# Patient Record
Sex: FEMALE | Race: WHITE | NOT HISPANIC OR LATINO | Employment: FULL TIME | URBAN - METROPOLITAN AREA
[De-identification: names, ages, dates, MRNs, and addresses within clinical notes are randomized per-mention and may not be internally consistent; named-entity substitution may affect disease eponyms.]

---

## 2017-01-16 ENCOUNTER — GENERIC CONVERSION - ENCOUNTER (OUTPATIENT)
Dept: OTHER | Facility: OTHER | Age: 37
End: 2017-01-16

## 2017-01-27 ENCOUNTER — ALLSCRIPTS OFFICE VISIT (OUTPATIENT)
Dept: PERINATAL CARE | Facility: MEDICAL CENTER | Age: 37
End: 2017-01-27
Payer: COMMERCIAL

## 2017-01-27 ENCOUNTER — GENERIC CONVERSION - ENCOUNTER (OUTPATIENT)
Dept: OTHER | Facility: OTHER | Age: 37
End: 2017-01-27

## 2017-01-27 PROCEDURE — 76811 OB US DETAILED SNGL FETUS: CPT | Performed by: OBSTETRICS & GYNECOLOGY

## 2017-01-27 PROCEDURE — 76817 TRANSVAGINAL US OBSTETRIC: CPT | Performed by: OBSTETRICS & GYNECOLOGY

## 2017-02-17 ENCOUNTER — GENERIC CONVERSION - ENCOUNTER (OUTPATIENT)
Dept: OTHER | Facility: OTHER | Age: 37
End: 2017-02-17

## 2017-02-17 DIAGNOSIS — E03.9 HYPOTHYROIDISM: ICD-10-CM

## 2017-02-17 DIAGNOSIS — Z34.82 ENCOUNTER FOR SUPERVISION OF OTHER NORMAL PREGNANCY, SECOND TRIMESTER: ICD-10-CM

## 2017-02-17 DIAGNOSIS — O24.414 INSULIN CONTROLLED GESTATIONAL DIABETES MELLITUS DURING PREGNANCY: ICD-10-CM

## 2017-02-20 ENCOUNTER — GENERIC CONVERSION - ENCOUNTER (OUTPATIENT)
Dept: OTHER | Facility: OTHER | Age: 37
End: 2017-02-20

## 2017-02-20 ENCOUNTER — ALLSCRIPTS OFFICE VISIT (OUTPATIENT)
Dept: PERINATAL CARE | Facility: CLINIC | Age: 37
End: 2017-02-20
Payer: COMMERCIAL

## 2017-02-20 PROCEDURE — 76825 ECHO EXAM OF FETAL HEART: CPT | Performed by: OBSTETRICS & GYNECOLOGY

## 2017-02-20 PROCEDURE — 93325 DOPPLER ECHO COLOR FLOW MAPG: CPT | Performed by: OBSTETRICS & GYNECOLOGY

## 2017-02-20 PROCEDURE — 76827 ECHO EXAM OF FETAL HEART: CPT | Performed by: OBSTETRICS & GYNECOLOGY

## 2017-02-23 ENCOUNTER — HOSPITAL ENCOUNTER (OUTPATIENT)
Facility: HOSPITAL | Age: 37
Discharge: HOME | End: 2017-02-23
Attending: OBSTETRICS & GYNECOLOGY | Admitting: OBSTETRICS & GYNECOLOGY
Payer: OTHER MISCELLANEOUS

## 2017-02-23 VITALS — HEART RATE: 93 BPM | SYSTOLIC BLOOD PRESSURE: 137 MMHG | TEMPERATURE: 98.4 F | DIASTOLIC BLOOD PRESSURE: 75 MMHG

## 2017-02-23 PROCEDURE — 99203 OFFICE O/P NEW LOW 30 MIN: CPT

## 2017-02-23 PROCEDURE — 99213 OFFICE O/P EST LOW 20 MIN: CPT

## 2017-02-23 PROCEDURE — 59025 FETAL NON-STRESS TEST: CPT | Performed by: OBSTETRICS & GYNECOLOGY

## 2017-02-23 RX ORDER — MULTIVITAMIN
1 TABLET ORAL DAILY
COMMUNITY
End: 2017-04-29 | Stop reason: ALTCHOICE

## 2017-02-23 RX ORDER — LEVOTHYROXINE SODIUM 0.1 MG/1
100 TABLET ORAL DAILY
COMMUNITY

## 2017-03-06 ENCOUNTER — APPOINTMENT (OUTPATIENT)
Dept: PERINATAL CARE | Facility: CLINIC | Age: 37
End: 2017-03-06
Payer: COMMERCIAL

## 2017-03-06 PROCEDURE — G0108 DIAB MANAGE TRN  PER INDIV: HCPCS | Performed by: OBSTETRICS & GYNECOLOGY

## 2017-03-10 ENCOUNTER — APPOINTMENT (OUTPATIENT)
Dept: PERINATAL CARE | Facility: MEDICAL CENTER | Age: 37
End: 2017-03-10
Payer: COMMERCIAL

## 2017-03-10 PROCEDURE — G0108 DIAB MANAGE TRN  PER INDIV: HCPCS | Performed by: OBSTETRICS & GYNECOLOGY

## 2017-03-18 ENCOUNTER — GENERIC CONVERSION - ENCOUNTER (OUTPATIENT)
Dept: OTHER | Facility: OTHER | Age: 37
End: 2017-03-18

## 2017-04-04 ENCOUNTER — GENERIC CONVERSION - ENCOUNTER (OUTPATIENT)
Dept: OTHER | Facility: OTHER | Age: 37
End: 2017-04-04

## 2017-04-07 ENCOUNTER — ALLSCRIPTS OFFICE VISIT (OUTPATIENT)
Dept: PERINATAL CARE | Facility: MEDICAL CENTER | Age: 37
End: 2017-04-07
Payer: COMMERCIAL

## 2017-04-07 ENCOUNTER — APPOINTMENT (OUTPATIENT)
Dept: PERINATAL CARE | Facility: MEDICAL CENTER | Age: 37
End: 2017-04-07
Payer: COMMERCIAL

## 2017-04-07 ENCOUNTER — GENERIC CONVERSION - ENCOUNTER (OUTPATIENT)
Dept: OTHER | Facility: OTHER | Age: 37
End: 2017-04-07

## 2017-04-07 PROCEDURE — 59025 FETAL NON-STRESS TEST: CPT | Performed by: OBSTETRICS & GYNECOLOGY

## 2017-04-07 PROCEDURE — 76816 OB US FOLLOW-UP PER FETUS: CPT | Performed by: OBSTETRICS & GYNECOLOGY

## 2017-04-11 ENCOUNTER — GENERIC CONVERSION - ENCOUNTER (OUTPATIENT)
Dept: OTHER | Facility: OTHER | Age: 37
End: 2017-04-11

## 2017-04-11 ENCOUNTER — ALLSCRIPTS OFFICE VISIT (OUTPATIENT)
Dept: PERINATAL CARE | Facility: MEDICAL CENTER | Age: 37
End: 2017-04-11
Payer: COMMERCIAL

## 2017-04-11 PROCEDURE — 59025 FETAL NON-STRESS TEST: CPT | Performed by: OBSTETRICS & GYNECOLOGY

## 2017-04-11 PROCEDURE — 76815 OB US LIMITED FETUS(S): CPT | Performed by: OBSTETRICS & GYNECOLOGY

## 2017-04-14 ENCOUNTER — GENERIC CONVERSION - ENCOUNTER (OUTPATIENT)
Dept: OTHER | Facility: OTHER | Age: 37
End: 2017-04-14

## 2017-04-18 ENCOUNTER — GENERIC CONVERSION - ENCOUNTER (OUTPATIENT)
Dept: OTHER | Facility: OTHER | Age: 37
End: 2017-04-18

## 2017-04-18 ENCOUNTER — ALLSCRIPTS OFFICE VISIT (OUTPATIENT)
Dept: PERINATAL CARE | Facility: MEDICAL CENTER | Age: 37
End: 2017-04-18
Payer: COMMERCIAL

## 2017-04-18 PROCEDURE — 59025 FETAL NON-STRESS TEST: CPT | Performed by: OBSTETRICS & GYNECOLOGY

## 2017-04-18 PROCEDURE — 76815 OB US LIMITED FETUS(S): CPT | Performed by: OBSTETRICS & GYNECOLOGY

## 2017-04-21 ENCOUNTER — GENERIC CONVERSION - ENCOUNTER (OUTPATIENT)
Dept: OTHER | Facility: OTHER | Age: 37
End: 2017-04-21

## 2017-04-21 ENCOUNTER — ALLSCRIPTS OFFICE VISIT (OUTPATIENT)
Dept: OTHER | Facility: OTHER | Age: 37
End: 2017-04-21

## 2017-04-24 DIAGNOSIS — O24.414 INSULIN CONTROLLED GESTATIONAL DIABETES MELLITUS DURING PREGNANCY: ICD-10-CM

## 2017-04-25 ENCOUNTER — ALLSCRIPTS OFFICE VISIT (OUTPATIENT)
Dept: PERINATAL CARE | Facility: MEDICAL CENTER | Age: 37
End: 2017-04-25
Payer: COMMERCIAL

## 2017-04-25 ENCOUNTER — GENERIC CONVERSION - ENCOUNTER (OUTPATIENT)
Dept: OTHER | Facility: OTHER | Age: 37
End: 2017-04-25

## 2017-04-25 PROCEDURE — 76815 OB US LIMITED FETUS(S): CPT | Performed by: OBSTETRICS & GYNECOLOGY

## 2017-04-25 PROCEDURE — 59025 FETAL NON-STRESS TEST: CPT | Performed by: OBSTETRICS & GYNECOLOGY

## 2017-04-28 ENCOUNTER — GENERIC CONVERSION - ENCOUNTER (OUTPATIENT)
Dept: OTHER | Facility: OTHER | Age: 37
End: 2017-04-28

## 2017-05-02 ENCOUNTER — GENERIC CONVERSION - ENCOUNTER (OUTPATIENT)
Dept: OTHER | Facility: OTHER | Age: 37
End: 2017-05-02

## 2017-05-02 ENCOUNTER — ALLSCRIPTS OFFICE VISIT (OUTPATIENT)
Dept: PERINATAL CARE | Facility: MEDICAL CENTER | Age: 37
End: 2017-05-02
Payer: COMMERCIAL

## 2017-05-02 LAB
CULT.,B-STREP SENSI PENICILLIN (HISTORICAL): ABNORMAL
EXTERNAL GROUP B STREP ANTIGEN: POSITIVE

## 2017-05-02 PROCEDURE — 76818 FETAL BIOPHYS PROFILE W/NST: CPT | Performed by: OBSTETRICS & GYNECOLOGY

## 2017-05-02 PROCEDURE — 76816 OB US FOLLOW-UP PER FETUS: CPT | Performed by: OBSTETRICS & GYNECOLOGY

## 2017-05-05 ENCOUNTER — GENERIC CONVERSION - ENCOUNTER (OUTPATIENT)
Dept: OTHER | Facility: OTHER | Age: 37
End: 2017-05-05

## 2017-05-07 ENCOUNTER — HOSPITAL ENCOUNTER (INPATIENT)
Facility: HOSPITAL | Age: 37
LOS: 2 days | Discharge: HOME | End: 2017-05-09
Attending: OBSTETRICS & GYNECOLOGY | Admitting: OBSTETRICS & GYNECOLOGY
Payer: COMMERCIAL

## 2017-05-07 ENCOUNTER — ANESTHESIA (INPATIENT)
Dept: LABOR AND DELIVERY | Facility: HOSPITAL | Age: 37
End: 2017-05-07
Payer: COMMERCIAL

## 2017-05-07 ENCOUNTER — ANESTHESIA EVENT (INPATIENT)
Dept: LABOR AND DELIVERY | Facility: HOSPITAL | Age: 37
End: 2017-05-07
Payer: COMMERCIAL

## 2017-05-07 DIAGNOSIS — Z3A.36 36 WEEKS GESTATION OF PREGNANCY: Primary | ICD-10-CM

## 2017-05-07 PROBLEM — E03.9 HYPOTHYROID: Status: ACTIVE | Noted: 2017-05-07

## 2017-05-07 PROBLEM — O09.529 ADVANCED MATERNAL AGE IN MULTIGRAVIDA: Status: ACTIVE | Noted: 2017-05-07

## 2017-05-07 PROBLEM — O24.419 GESTATIONAL DIABETES MELLITUS (GDM): Status: ACTIVE | Noted: 2017-05-07

## 2017-05-07 LAB
ABO GROUP BLD: NORMAL
BASE EXCESS BLDCOA CALC-SCNC: -10.2 MMOL/L (ref 3–11)
BASE EXCESS BLDCOV CALC-SCNC: -7.3 MMOL/L (ref 1–9)
BASOPHILS # BLD AUTO: 0.02 THOUSANDS/ΜL (ref 0–0.1)
BASOPHILS NFR BLD AUTO: 0 % (ref 0–1)
BLD GP AB SCN SERPL QL: NEGATIVE
EOSINOPHIL # BLD AUTO: 0.07 THOUSAND/ΜL (ref 0–0.61)
EOSINOPHIL NFR BLD AUTO: 0 % (ref 0–6)
ERYTHROCYTE [DISTWIDTH] IN BLOOD BY AUTOMATED COUNT: 14.9 % (ref 11.6–15.1)
GLUCOSE SERPL-MCNC: 75 MG/DL (ref 65–140)
GLUCOSE SERPL-MCNC: 79 MG/DL (ref 65–140)
GLUCOSE SERPL-MCNC: 82 MG/DL (ref 65–140)
GLUCOSE SERPL-MCNC: 83 MG/DL (ref 65–140)
GLUCOSE SERPL-MCNC: 89 MG/DL (ref 65–140)
HCO3 BLDCOA-SCNC: 18.3 MMOL/L (ref 17.3–27.3)
HCO3 BLDCOV-SCNC: 17.9 MMOL/L (ref 12.2–28.6)
HCT VFR BLD AUTO: 37 % (ref 34.8–46.1)
HGB BLD-MCNC: 12.1 G/DL (ref 11.5–15.4)
LYMPHOCYTES # BLD AUTO: 1.44 THOUSANDS/ΜL (ref 0.6–4.47)
LYMPHOCYTES NFR BLD AUTO: 9 % (ref 14–44)
MCH RBC QN AUTO: 26.6 PG (ref 26.8–34.3)
MCHC RBC AUTO-ENTMCNC: 32.7 G/DL (ref 31.4–37.4)
MCV RBC AUTO: 81 FL (ref 82–98)
MONOCYTES # BLD AUTO: 0.77 THOUSAND/ΜL (ref 0.17–1.22)
MONOCYTES NFR BLD AUTO: 5 % (ref 4–12)
NEUTROPHILS # BLD AUTO: 13.5 THOUSANDS/ΜL (ref 1.85–7.62)
NEUTS SEG NFR BLD AUTO: 86 % (ref 43–75)
NRBC BLD AUTO-RTO: 0 /100 WBCS
O2 CT VFR BLDCOA CALC: 11.3 ML/DL
OXYHGB MFR BLDCOA: 55.1 %
OXYHGB MFR BLDCOV: 63.1 %
PCO2 BLDCOA: 50.6 MM[HG] (ref 30–60)
PCO2 BLDCOV: 35.3 MM HG (ref 27–43)
PH BLDCOA: 7.18 [PH] (ref 7.23–7.43)
PH BLDCOV: 7.32 [PH] (ref 7.19–7.49)
PLATELET # BLD AUTO: 287 THOUSANDS/UL (ref 149–390)
PMV BLD AUTO: 9.5 FL (ref 8.9–12.7)
PO2 BLDCOA: 28.8 MM HG (ref 5–25)
PO2 BLDCOV: 27.1 MM HG (ref 15–45)
RBC # BLD AUTO: 4.55 MILLION/UL (ref 3.81–5.12)
RH BLD: POSITIVE
SAO2 % BLDCOV: 12.5 ML/DL
SPECIMEN EXPIRATION DATE: NORMAL
WBC # BLD AUTO: 16.1 THOUSAND/UL (ref 4.31–10.16)

## 2017-05-07 PROCEDURE — 86900 BLOOD TYPING SEROLOGIC ABO: CPT | Performed by: OBSTETRICS & GYNECOLOGY

## 2017-05-07 PROCEDURE — 86850 RBC ANTIBODY SCREEN: CPT | Performed by: OBSTETRICS & GYNECOLOGY

## 2017-05-07 PROCEDURE — 82948 REAGENT STRIP/BLOOD GLUCOSE: CPT

## 2017-05-07 PROCEDURE — 86592 SYPHILIS TEST NON-TREP QUAL: CPT | Performed by: OBSTETRICS & GYNECOLOGY

## 2017-05-07 PROCEDURE — 99214 OFFICE O/P EST MOD 30 MIN: CPT

## 2017-05-07 PROCEDURE — 85025 COMPLETE CBC W/AUTO DIFF WBC: CPT | Performed by: OBSTETRICS & GYNECOLOGY

## 2017-05-07 PROCEDURE — 82805 BLOOD GASES W/O2 SATURATION: CPT | Performed by: OBSTETRICS & GYNECOLOGY

## 2017-05-07 PROCEDURE — 86901 BLOOD TYPING SEROLOGIC RH(D): CPT | Performed by: OBSTETRICS & GYNECOLOGY

## 2017-05-07 PROCEDURE — 4A1HXCZ MONITORING OF PRODUCTS OF CONCEPTION, CARDIAC RATE, EXTERNAL APPROACH: ICD-10-PCS | Performed by: OBSTETRICS & GYNECOLOGY

## 2017-05-07 RX ORDER — BUTORPHANOL TARTRATE 1 MG/ML
1 INJECTION, SOLUTION INTRAMUSCULAR; INTRAVENOUS ONCE
Status: COMPLETED | OUTPATIENT
Start: 2017-05-07 | End: 2017-05-07

## 2017-05-07 RX ORDER — DOCUSATE SODIUM 100 MG/1
100 CAPSULE, LIQUID FILLED ORAL 2 TIMES DAILY
Status: DISCONTINUED | OUTPATIENT
Start: 2017-05-07 | End: 2017-05-09 | Stop reason: HOSPADM

## 2017-05-07 RX ORDER — ROPIVACAINE HYDROCHLORIDE 2 MG/ML
INJECTION, SOLUTION EPIDURAL; INFILTRATION; PERINEURAL AS NEEDED
Status: DISCONTINUED | OUTPATIENT
Start: 2017-05-07 | End: 2017-05-07 | Stop reason: SURG

## 2017-05-07 RX ORDER — BUTORPHANOL TARTRATE 1 MG/ML
INJECTION, SOLUTION INTRAMUSCULAR; INTRAVENOUS
Status: COMPLETED
Start: 2017-05-07 | End: 2017-05-07

## 2017-05-07 RX ORDER — ACETAMINOPHEN 325 MG/1
650 TABLET ORAL EVERY 4 HOURS PRN
Status: DISCONTINUED | OUTPATIENT
Start: 2017-05-07 | End: 2017-05-09 | Stop reason: HOSPADM

## 2017-05-07 RX ORDER — IBUPROFEN 600 MG/1
600 TABLET ORAL EVERY 6 HOURS PRN
Status: DISCONTINUED | OUTPATIENT
Start: 2017-05-07 | End: 2017-05-09 | Stop reason: HOSPADM

## 2017-05-07 RX ORDER — SODIUM CHLORIDE 9 MG/ML
125 INJECTION, SOLUTION INTRAVENOUS CONTINUOUS
Status: DISCONTINUED | OUTPATIENT
Start: 2017-05-07 | End: 2017-05-07

## 2017-05-07 RX ORDER — OXYCODONE HYDROCHLORIDE AND ACETAMINOPHEN 5; 325 MG/1; MG/1
2 TABLET ORAL EVERY 4 HOURS PRN
Status: DISCONTINUED | OUTPATIENT
Start: 2017-05-07 | End: 2017-05-09 | Stop reason: HOSPADM

## 2017-05-07 RX ORDER — LEVOTHYROXINE SODIUM 0.1 MG/1
100 TABLET ORAL
Status: DISCONTINUED | OUTPATIENT
Start: 2017-05-08 | End: 2017-05-09 | Stop reason: HOSPADM

## 2017-05-07 RX ORDER — DIAPER,BRIEF,INFANT-TODD,DISP
1 EACH MISCELLANEOUS AS NEEDED
Status: DISCONTINUED | OUTPATIENT
Start: 2017-05-07 | End: 2017-05-09 | Stop reason: HOSPADM

## 2017-05-07 RX ORDER — OXYTOCIN/RINGER'S LACTATE 30/500 ML
1-30 PLASTIC BAG, INJECTION (ML) INTRAVENOUS
Status: DISCONTINUED | OUTPATIENT
Start: 2017-05-07 | End: 2017-05-07

## 2017-05-07 RX ORDER — OXYCODONE HYDROCHLORIDE AND ACETAMINOPHEN 5; 325 MG/1; MG/1
1 TABLET ORAL EVERY 4 HOURS PRN
Status: DISCONTINUED | OUTPATIENT
Start: 2017-05-07 | End: 2017-05-09 | Stop reason: HOSPADM

## 2017-05-07 RX ORDER — LEVOTHYROXINE SODIUM 0.1 MG/1
100 TABLET ORAL DAILY
Status: DISCONTINUED | OUTPATIENT
Start: 2017-05-07 | End: 2017-05-07

## 2017-05-07 RX ORDER — SENNOSIDES 8.6 MG
1 TABLET ORAL
Status: DISCONTINUED | OUTPATIENT
Start: 2017-05-07 | End: 2017-05-09 | Stop reason: HOSPADM

## 2017-05-07 RX ADMIN — IBUPROFEN 600 MG: 600 TABLET, FILM COATED ORAL at 19:32

## 2017-05-07 RX ADMIN — WITCH HAZEL 1 PAD: 500 SOLUTION RECTAL; TOPICAL at 19:32

## 2017-05-07 RX ADMIN — BENZOCAINE AND MENTHOL: 20; .5 SPRAY TOPICAL at 19:32

## 2017-05-07 RX ADMIN — ROPIVACAINE HYDROCHLORIDE 8 ML: 2 INJECTION, SOLUTION EPIDURAL; INFILTRATION at 11:05

## 2017-05-07 RX ADMIN — HYDROCORTISONE 1 APPLICATION: 10 CREAM TOPICAL at 19:32

## 2017-05-07 RX ADMIN — SODIUM CHLORIDE 999 ML/HR: 0.9 INJECTION, SOLUTION INTRAVENOUS at 09:28

## 2017-05-07 RX ADMIN — Medication 2000 MG: at 09:31

## 2017-05-07 RX ADMIN — ROPIVACAINE HYDROCHLORIDE: 2 INJECTION, SOLUTION EPIDURAL; INFILTRATION at 11:12

## 2017-05-07 RX ADMIN — Medication 1 MILLI-UNITS/MIN: at 12:44

## 2017-05-07 RX ADMIN — SODIUM CHLORIDE 125 ML/HR: 0.9 INJECTION, SOLUTION INTRAVENOUS at 10:40

## 2017-05-07 RX ADMIN — CEFAZOLIN SODIUM 1000 MG: 1 SOLUTION INTRAVENOUS at 16:38

## 2017-05-07 RX ADMIN — BUTORPHANOL TARTRATE 1 MG: 1 INJECTION, SOLUTION INTRAMUSCULAR; INTRAVENOUS at 10:00

## 2017-05-07 RX ADMIN — LEVOTHYROXINE SODIUM 100 MCG: 100 TABLET ORAL at 14:03

## 2017-05-08 LAB — RPR SER QL: NORMAL

## 2017-05-08 RX ADMIN — IBUPROFEN 600 MG: 600 TABLET, FILM COATED ORAL at 08:28

## 2017-05-08 RX ADMIN — IBUPROFEN 600 MG: 600 TABLET, FILM COATED ORAL at 01:50

## 2017-05-08 RX ADMIN — IBUPROFEN 600 MG: 600 TABLET, FILM COATED ORAL at 16:04

## 2017-05-08 RX ADMIN — LEVOTHYROXINE SODIUM 100 MCG: 100 TABLET ORAL at 05:06

## 2017-05-08 RX ADMIN — DOCUSATE SODIUM 100 MG: 100 CAPSULE, LIQUID FILLED ORAL at 18:35

## 2017-05-08 RX ADMIN — DOCUSATE SODIUM 100 MG: 100 CAPSULE, LIQUID FILLED ORAL at 08:28

## 2017-05-09 VITALS
DIASTOLIC BLOOD PRESSURE: 72 MMHG | HEART RATE: 88 BPM | HEIGHT: 62 IN | TEMPERATURE: 97.7 F | OXYGEN SATURATION: 96 % | BODY MASS INDEX: 40.12 KG/M2 | RESPIRATION RATE: 20 BRPM | SYSTOLIC BLOOD PRESSURE: 121 MMHG | WEIGHT: 218 LBS

## 2017-05-09 RX ORDER — DOCUSATE SODIUM 100 MG/1
100 CAPSULE, LIQUID FILLED ORAL 2 TIMES DAILY PRN
Qty: 60 CAPSULE | Refills: 0 | Status: SHIPPED | OUTPATIENT
Start: 2017-05-09 | End: 2019-06-18 | Stop reason: ALTCHOICE

## 2017-05-09 RX ORDER — DIAPER,BRIEF,INFANT-TODD,DISP
1 EACH MISCELLANEOUS AS NEEDED
Qty: 30 G | Refills: 0 | Status: SHIPPED | OUTPATIENT
Start: 2017-05-09 | End: 2019-06-18 | Stop reason: ALTCHOICE

## 2017-05-09 RX ORDER — ACETAMINOPHEN 325 MG/1
650 TABLET ORAL EVERY 4 HOURS PRN
Qty: 30 TABLET | Refills: 0 | Status: SHIPPED | OUTPATIENT
Start: 2017-05-09 | End: 2019-06-18 | Stop reason: ALTCHOICE

## 2017-05-09 RX ORDER — IBUPROFEN 600 MG/1
600 TABLET ORAL EVERY 6 HOURS PRN
Qty: 30 TABLET | Refills: 0 | Status: SHIPPED | OUTPATIENT
Start: 2017-05-09 | End: 2019-06-18 | Stop reason: ALTCHOICE

## 2017-05-09 RX ADMIN — DOCUSATE SODIUM 100 MG: 100 CAPSULE, LIQUID FILLED ORAL at 08:00

## 2017-05-09 RX ADMIN — IBUPROFEN 600 MG: 600 TABLET, FILM COATED ORAL at 05:56

## 2017-05-09 RX ADMIN — LEVOTHYROXINE SODIUM 100 MCG: 100 TABLET ORAL at 05:56

## 2017-05-17 LAB — PLACENTA IN STORAGE: NORMAL

## 2017-08-29 ENCOUNTER — ALLSCRIPTS OFFICE VISIT (OUTPATIENT)
Dept: OTHER | Facility: OTHER | Age: 37
End: 2017-08-29

## 2018-01-10 NOTE — PROGRESS NOTES
2017         RE: Karen Tucker                                To: [facility]   MR#: [id number]                                   [address]   : Prosper Erickson [address] [facility]   ENC: [id number]                             Fax: [phone]   (Exam #: [id number])      The LMP of this 39year old,  G4, P0-1-2-1 patient was AUG 25 2016, giving   her an IGNACIO of 2017 and a current gestational age of 29 weeks 5 days   by dates  A sonographic examination was performed on 2017 using   real time equipment  The ultrasound examination was performed using   abdominal technique  The patient has a BMI of 39 7  Her blood pressure   today was 112/74  Earliest ultrasound found in her record: 10/25/16 8w4d  17 IGNACIO      Cardiac motion was observed at 125 bpm       INDICATIONS      diabetes, gestational, class A2      Exam Types      Amniotic Fluid Index   NST      RESULTS      Fetus # 1 of 1   Vertex presentation   Placenta Location = Anterior   No placenta previa   Placenta Grade = II      The NST was reactive with no decelerations  AMNIOTIC FLUID      Q1: 5 9      Q2: 4 3      Q3: 4 5      Q4: 4 8   MACK Total = 19 5 cm   Amniotic Fluid: Normal      IMPRESSION      Fontaine IUP   Vertex presentation   Regular fetal heart rate of 125 bpm   Anterior placenta   No placenta previa      RECOMMENDATION      MACK: 1 Week   NST: 2X per week      KALEB Oliver M D     Maternal-Fetal Medicine   Electronically signed 17 11:49

## 2018-01-12 VITALS
WEIGHT: 216.03 LBS | HEIGHT: 62 IN | SYSTOLIC BLOOD PRESSURE: 111 MMHG | BODY MASS INDEX: 39.75 KG/M2 | DIASTOLIC BLOOD PRESSURE: 74 MMHG

## 2018-01-12 VITALS
WEIGHT: 215.6 LBS | DIASTOLIC BLOOD PRESSURE: 76 MMHG | SYSTOLIC BLOOD PRESSURE: 117 MMHG | HEIGHT: 62 IN | BODY MASS INDEX: 39.67 KG/M2

## 2018-01-12 VITALS
SYSTOLIC BLOOD PRESSURE: 102 MMHG | HEIGHT: 62 IN | BODY MASS INDEX: 40.01 KG/M2 | WEIGHT: 217.4 LBS | DIASTOLIC BLOOD PRESSURE: 69 MMHG

## 2018-01-12 NOTE — PROGRESS NOTES
2017         RE: Isidra Gibsonmichi                                To: [facility]   MR#: [id number]                                   [address] [facility]   : [address] [place] [address]   ENC: [id number]                             Fax: [phone]   (Exam #: [id number])      The LMP of this 39year old,  G4, P0-1-2-1 patient was AUG 25 2016, giving   her an IGNACIO of 2017 and a current gestational age of 26 weeks 5 days   by dates  A sonographic examination was performed on 2017 using   real time equipment  The ultrasound examination was performed using   abdominal technique  The patient has a BMI of 39 5  Her blood pressure   today was 117/76  Earliest ultrasound found in her record: 10/25/16 8w4d  17 IGNACIO      Cardiac motion was observed at 141 bpm       INDICATIONS      FOB cardiac defect   increased BMI   diabetes, gestational, class A2      Exam Types      Amniotic Fluid Index      RESULTS      Fetus # 1 of 1   Vertex presentation   Placenta Location = Anterior   No placenta previa   Placenta Grade = II      The NST was reactive with no decelerations  AMNIOTIC FLUID      Q1: 3 2      Q2: 5 0      Q3: 5 4      Q4: 6 2   MACK Total = 19 8 cm   Amniotic Fluid: Normal      IMPRESSION      Fontaine IUP   Vertex presentation   Regular fetal heart rate of 141 bpm   Anterior placenta   No placenta previa      GENERAL COMMENT      Her follow up care should include twice weekly NST's and a once weekly   amniotic fluid assessment, along with her daily kick counts  KALEB Chavarria M D     Maternal-Fetal Medicine   Electronically signed 17 19:31

## 2018-01-12 NOTE — PROGRESS NOTES
2017         RE: Melissa Common                                  To: [facility]   MR#: [id number]                                   [address]   : [address]per Herrera [address] [facility]   ENC: [id number]                             Fax: [phone]   (Exam #: [id number])      The LMP of this 39year old,  G4, P0-1-2-1 patient was AUG 25 2016, giving   her an IGNACIO of 2017 and a current gestational age of 25 weeks 1 day   by dates  A sonographic examination was performed on 2017 using   real time equipment  The ultrasound examination was performed using   abdominal & vaginal techniques  The patient has a BMI of 37 7  Her blood   pressure today was 115/73  Earliest ultrasound found in her record: 10/25/16 8w4d  17 IGNACIO      Virginia Johnson is on prenatal vitamins daily and levothyroxine 100 ?g daily  She   reports an allergy to penicillin that causes hives  She denies any use of   cigarettes, alcohol or illicit drugs  She used to smoke 5 to 10 cigarettes   daily prior to pregnancy  Her past medical history significant for   supraventricular tachycardia for which she required an ablation in   She also reports a history of hypertension that occurred at the end of her   last pregnancy  She also reports a history of hypothyroidism controlled on   levothyroxine and which is followed by Dr Benny Grider  She denies any first   generation family history of hypertension, diabetes, thrombosis or   congenital anomalies on her side  Her gia Pérez reports he was told he   had a hole in the heart and was followed by a pediatric cardiology up   until he was age 8 and then he hasn't gone for follow-up since  He does   not remember being actually discharged from care of his cardiologist or if   his parents just stopped taking him  Her OB history includes 2   terminations at 9 weeks in  and  and a 5 lbs   12 oz  baby boy that   was delivered vaginally on 8/8/2000 after induction at 36 weeks due to   preeclampsia and he remained in the NICU for 8 days but is otherwise   normal now  Virginia Johnson was seen today with both her fianc? and 5 other family   members  They are excited as they are set to be  tomorrow  She is   advanced maternal age and declined any genetic screening previously and   again today as they do not believe it would change their management and   would instead increase their anxiety if it returned as abnormal  Her   initial TSH was 1 76 on October 31  Cardiac motion was observed at 151 bpm       INDICATIONS      fetal anatomical survey   advanced maternal age   prior preeclampsia   obesity   long  interval pregnancy      Exam Types      LEVEL II   Transvaginal      RESULTS      Fetus # 1 of 1   Vertex presentation   Fetal growth appeared normal   Placenta Location = Anterior   No placenta previa      MEASUREMENTS (* Included In Average GA)      AC              16 2 cm        21 weeks 0 days* (30%)   BPD              5 5 cm        22 weeks 6 days* (68%)   HC              19 7 cm        21 weeks 6 days* (41%)   Femur            3 6 cm        21 weeks 4 days* (32%)      Nuchal Fold      4 0 mm   NBL              8 1 mm      Humerus          3 5 cm        21 weeks 6 days  (47%)      Cerebellum       2 4 cm        23 weeks 2 days   Biorbit          3 4 cm        21 weeks 6 days   CisternaMagna    6 6 mm      HC/AC           1 22   FL/AC           0 22   FL/BPD          0 64   EFW (Ac/Fl/Hc)   418 grams - 0 lbs 15 oz                 (26%)      THE AVERAGE GESTATIONAL AGE is 21 weeks 6 days +/- 10 days  AMNIOTIC FLUID         Largest Vertical Pocket = 5 6 cm   Amniotic Fluid: Normal      CERVICAL EVALUATION      The cervix appeared normal (Ultrasound Examination)  SUPINE      Cervical Length: 3 21 cm      OTHER TEST RESULTS           Funneling?: No             Dynamic Changes?: No        Resp   To TFP?: No      ANATOMY      Head Normal   Face/Neck                               Normal   Th  Cav  Normal   Heart                                   See Details   Abd  Cav  Normal   Stomach                                 Normal   Right Kidney                            Normal   Left Kidney                             Normal   Bladder                                 Normal   Abd  Wall                               Normal   Spine                                   Not Visualized   Extrems                                 Normal   Genitalia                               Normal   Placenta                                Normal   Umbl  Cord                              Normal   Uterus                                  Normal   PCI                                     Normal      ANATOMY DETAILS      Visualized Appearing Sonographically Normal:   HEAD: (Calvarium, BPD Level, Cavum, Lateral Ventricles, Choroid Plexus,   Cerebellum, Cisterna Magna);    FACE/NECK: (Neck, Nuchal Fold, Profile,   Orbits, Nose/Lips, Palate, Face);    TH  CAV  : (Lungs, Diaphragm); HEART: (Four Chamber View, Proximal Left Outflow, Proximal Right Outflow,   3VV, 3 Vessel Trachea, Short Axis of Greater Vessels, Ductal Arch, Aortic   Arch, IVC, SVC, Cardiac Axis, Cardiac Position);    ABD  CAV : (Liver);      STOMACH, RIGHT KIDNEY, LEFT KIDNEY, BLADDER, ABD  WALL, EXTREMS: (Lt   Humerus, Rt Humerus, Lt Forearm, Rt Forearm, Lt Hand, Rt Hand, Lt Femur,   Rt Femur, Lt Low Leg, Rt Low Leg, Lt Foot, Rt Foot);    GENITALIA   (Female), PLACENTA, UMBL  CORD, UTERUS, PCI      Not Visualized:   HEART: (Interventricular Septum, Interatrial Septum); SPINE      ANATOMY COMMENTS      Her survey of the fetal anatomy is not complete  No fetal structural abnormality or ultrasound marker for aneuploidy is   identified on the Level II ultrasound study today    The missed or limited   views above are secondary to her skin thickness, fetal position, late   gestational age  Fetal growth and amniotic fluid volume appear normal     Active movement of the fetal body & extremities was seen  There is no   suspicion of a subchorionic bleed  The placental cord insertion was   central       ADNEXA      The left ovary appeared normal and measured 3 0 x 2 0 x 2 6 cm with a   volume of 8 2 cc  The right ovary appeared normal and measured 2 4 x 2 2 x   2 4 cm with a volume of 6 6 cc  IMPRESSION      Fontaine IUP   21 weeks and 6 days by this ultrasound  (IGNACIO=MEENU 3 2017)   Vertex presentation   Fetal growth appeared normal   Regular fetal heart rate of 151 bpm   Anterior placenta   No placenta previa      GENERAL COMMENT      OFFICE CONSULT      As per your request, a consultation was performed on your patient for the   following indication: advanced maternal age, prior  induction for   preeclampsia, declined genetic screening, hypothyroidism, and family   history of a cardiac defect in the father the baby      The patient was informed of the findings and counseled about the   limitations of the exam in detecting all forms of fetal congenital   abnormalities  She denies any vaginal bleeding or uterine cramping/contractions  She does   feel fetal movement  Exam shows she is comfortable and her abdomen is non tender  Follow up recommended:   1  Recommend a fetal echo because of the father the baby's history of a   problem in the heart that required follow-up till he was age 8 by   pediatric cardiology  2  Recommend a follow-up ultrasound for growth at 32 weeks secondary to   her advanced maternal age  3  I reviewed with both parents about her age and increased risk for   aneuploidy and discussed the options of cell free DNA screening versus   invasive screening for aneuploidy   Again they both declined because it   wouldn't change there management and they were worried about the anxiety   it may bring if the results returned as abnormal    4  Recommend serial TSH levels at least every 3 months in pregnancy and   will titrate her medication to keep her TSH less than 3    5  She denies any complications of her SVT in pregnancy  6  Recommend baseline preeclamptic labs  The majority of time (greater then 50%) was spent on counseling and   coordination of care of this patient and /or family member  Approximate   face to face time was 30 minutes  KALEB Whaley M D     Maternal-Fetal Medicine   Electronically signed 01/28/17 11:27

## 2018-01-13 VITALS
BODY MASS INDEX: 40.21 KG/M2 | SYSTOLIC BLOOD PRESSURE: 119 MMHG | WEIGHT: 218.5 LBS | HEIGHT: 62 IN | DIASTOLIC BLOOD PRESSURE: 81 MMHG

## 2018-01-13 VITALS
SYSTOLIC BLOOD PRESSURE: 112 MMHG | BODY MASS INDEX: 39.93 KG/M2 | WEIGHT: 217 LBS | HEIGHT: 62 IN | DIASTOLIC BLOOD PRESSURE: 74 MMHG

## 2018-01-13 NOTE — PROGRESS NOTES
2017         RE: Leah Alvarez                                To: [facility]   MR#: [id number]                                   [address] [facility]   : [address] [place] [address] Dre [address], [address] [facility]   ENC: [id number]                             Fax: [phone]   (Exam #: [id number])      The LMP of this 39year old,  G4, P0-1-2-1 patient was AUG 25 2016, giving   her an IGNACIO of 2017 and a current gestational age of 29 weeks 5 days   by dates  A sonographic examination was performed on 2017 using   real time equipment  The ultrasound examination was performed using   abdominal technique  The patient has a BMI of 39 7  Her blood pressure   today was 102/69  Earliest ultrasound found in her record: 10/25/16 8w4d  17 IGNACIO      Cardiac motion was observed at 132 bpm       INDICATIONS      diabetes, gestational, class A2   morbid obesity      Exam Types      Amniotic Fluid Index      RESULTS      Fetus # 1 of 1   Vertex presentation   Placenta Location = Anterior   No placenta previa   Placenta Grade = II      The NST was reactive with no decelerations  AMNIOTIC FLUID      Q1: 4 4      Q2: 1 1      Q3: 4 3      Q4: 5 0   MACK Total = 14 8 cm   Amniotic Fluid: Normal      IMPRESSION      Fontaine IUP   Vertex presentation   Regular fetal heart rate of 132 bpm   Anterior placenta   No placenta previa      GENERAL COMMENT      Her follow up care should include twice weekly NST's and a once weekly   amniotic fluid assessment, along with her daily kick counts  KALEB Guidry M D     Maternal-Fetal Medicine   Electronically signed 17 18:24

## 2018-01-14 VITALS
DIASTOLIC BLOOD PRESSURE: 61 MMHG | BODY MASS INDEX: 39.31 KG/M2 | SYSTOLIC BLOOD PRESSURE: 127 MMHG | HEIGHT: 62 IN | WEIGHT: 213.6 LBS

## 2018-01-14 VITALS
SYSTOLIC BLOOD PRESSURE: 115 MMHG | WEIGHT: 206.8 LBS | BODY MASS INDEX: 38.05 KG/M2 | HEIGHT: 62 IN | DIASTOLIC BLOOD PRESSURE: 73 MMHG

## 2018-01-14 NOTE — PROGRESS NOTES
2017         RE: Shreya Juarez                                To: [facility]   MR#: [id number]                                   [address] [facility]   : [address] [place] [address]   ENC: [id number]                             Fax: [phone]   (Exam #: [id number])      The LMP of this 39year old,  G4, P0-1-2-1 patient was AUG 25 2016, giving   her an IGNACIO of 2017 and a current gestational age of 26 weeks 1 day   by dates  A sonographic examination was performed on 2017 using real   time equipment  The ultrasound examination was performed using abdominal   technique  The patient has a BMI of 39 5  Her blood pressure today was   111/74  Earliest ultrasound found in her record: 10/25/16 8w4d  17 IGNACIO      Cardiac motion was observed at 141 bpm       INDICATIONS      FOB cardiac defect   Interval growth assesment   obesity      Exam Types      Level I      RESULTS      Fetus # 1 of 1   Vertex presentation   Fetal growth appeared normal   Placenta Location = Anterior   No placenta previa   Placenta Grade = II      MEASUREMENTS (* Included In Average GA)      AC              28 9 cm        33 weeks 0 days* (64%)   BPD              8 7 cm        34 weeks 6 days* (92%)   HC              30 3 cm        33 weeks 2 days* (55%)   Femur            5 9 cm        30 weeks 6 days* (28%)      HC/AC           1 05   FL/AC           0 20   FL/BPD          0 68   EFW (Ac/Fl/Hc)  1968 grams - 4 lbs 5 oz                 (50%)      THE AVERAGE GESTATIONAL AGE is 33 weeks 0 days +/- 18 days  AMNIOTIC FLUID      Q1: 6 4      Q2: 3 0      Q3: 1 7      Q4: 4 2   MACK Total = 15 3 cm   Amniotic Fluid: Normal      ANATOMY DETAILS      Visualized Appearing Sonographically Normal:   HEAD: (Calvarium, BPD Level);    TH  CAV : (Diaphragm);     HEART: (Four   Chamber View, Proximal Left Outflow, Proximal Right Outflow, 3VV, Cardiac   Axis, Cardiac Position);    STOMACH, RIGHT KIDNEY, BLADDER, PLACENTA      Not Visualized:   HEAD: (Cavum, Lateral Ventricles, Cerebellum);    LEFT KIDNEY      IMPRESSION      Fontaine IUP   33 weeks and 0 days by this ultrasound  (IGNACIO=MAY 26 2017)   Vertex presentation   Fetal growth appeared normal   Regular fetal heart rate of 141 bpm   Anterior placenta   No placenta previa      GENERAL COMMENT      On exam today the patient appears well, in no acute distress, and denies   any complaints  Her abdomen is non-tender  There has been appropriate interval fetal growth  Good fetal movement and   tone are seen  The amniotic fluid volume appears normal   The placenta is   anterior and it appears sonographically normal   The anatomic structures   listed above could not be optimally imaged today because of fetal   position; however, these structures were seen on a prior ultrasound and   appeared sonographically normal   The patient was informed of today's   findings and all of her questions were answered  The limitations of   ultrasound were reviewed with the patient   labor precautions and   fetal kick counts were reviewed  We talked about the patient's current state of her diabetes  She   continues to maintain close contact with our diabetes in pregnancy program   and she continues to do a good job at controlling her blood sugars   currently on Levemir in the evening  We discussed appropriate follow-up   and she will continue twice weekly  surveillance with a fetal   growth evaluation 4 weeks for the indication of A2 gestational diabetes   and morbid obesity  Thank you very much for allowing us to participate in the care of this   very nice patient  Should you have any questions, please do not hesitate   to contact our office        Please note, in addition to the time spent discussing the results of the   ultrasound, I spent approximately 10 minutes of face-to-face time with the   patient, greater than 50% of which was spent in counseling and the   coordination of care for this patient  Portions of the record may have been created with voice recognition   software  Occasional wrong word or "sound a like" substitutions may have   occurred due to the inherent limitations of voice recognition software  Read the chart carefully and recognize, using context, where substitutions   have occurred  KALEB Yoder M D     Electronically signed 04/07/17 16:51

## 2018-01-15 VITALS
SYSTOLIC BLOOD PRESSURE: 118 MMHG | BODY MASS INDEX: 36.72 KG/M2 | WEIGHT: 199.56 LBS | HEART RATE: 67 BPM | HEIGHT: 62 IN | DIASTOLIC BLOOD PRESSURE: 72 MMHG

## 2018-01-16 NOTE — PROGRESS NOTES
MAY 2 2017         RE: Serg Wiggins                                To: [facility]   MR#: [id number]                                   [address] [facility]   : [address] [place] [address]   ENC: [id number]                             Fax: [phone]   (Exam #: [id number])      The LMP of this 39year old,  G4, P0-1-2-1 patient was AUG 25 2016, giving   her an IGNACIO of 2017 and a current gestational age of 27 weeks 5 days   by dates  A sonographic examination was performed on MAY 2 2017 using real   time equipment  The ultrasound examination was performed using abdominal   technique  The patient has a BMI of 40 1  Her blood pressure today was   119/81  Earliest ultrasound found in her record: 10/25/16 8w4d  17 IGNACIO      Cardiac motion was observed at 138 bpm       INDICATIONS      diabetes, gestational, class A2   morbid obesity      Exam Types      Amniotic Fluid Index      RESULTS      Fetus # 1 of 1   Vertex presentation   Fetal growth appeared normal   Placenta Location = Anterior   No placenta previa   Placenta Grade = II      The NST was reactive with no decelerations  MEASUREMENTS (* Included In Average GA)      AC              31 4 cm        35 weeks 4 days* (48%)   BPD              9 3 cm        37 weeks 6 days* (86%)   HC              32 0 cm        35 weeks 4 days* (35%)   Femur            7 0 cm        35 weeks 4 days* (55%)      HC/AC           1 02   FL/AC           0 22   FL/BPD          0 75   EFW (Ac/Fl/Hc)  2711 grams - 5 lbs 15 oz                 (43%)      THE AVERAGE GESTATIONAL AGE is 36 weeks 1 day +/- 21 days        AMNIOTIC FLUID      Q1: 5 0      Q2: 4 3      Q3: 5 1      Q4:   MACK Total = 14 5 cm   Amniotic Fluid: Normal      ANATOMY DETAILS      Visualized Appearing Sonographically Normal:   HEAD: (Calvarium, BPD Level, Cavum, Lateral Ventricles, Choroid Plexus,   Cisterna Magna);    TH  CAV : (Diaphragm); HEART: ([facility],   Cardiac Axis, Cardiac Position);    STOMACH, RIGHT KIDNEY, LEFT KIDNEY,   BLADDER, PLACENTA      Suboptimally Visualized:   HEAD: (Cerebellum); HEART: (Proximal Left Outflow, Proximal Right   Outflow)      BIOPHYSICAL PROFILE      The Biophysical Profile score was 10/10  Breathin  Movement: 2  Tone: 2  AFV: 2  NST: 2   The NST was reactive with no decelerations  IMPRESSION      Fontaine IUP   36 weeks and 1 day by this ultrasound  (IGNACIO=MAY 29 2017)   Vertex presentation   Fetal growth appeared normal   Regular fetal heart rate of 138 bpm   Anterior placenta   No placenta previa      GENERAL COMMENT      On exam today the patient appears well, in no acute distress, and denies   any complaints  Her abdomen is non-tender  There has been appropriate interval fetal growth  Good fetal movement and   tone are seen  The amniotic fluid volume appears normal   The placenta is   anterior and it appears sonographically normal   The anatomic structures   listed above could not be optimally imaged today because of fetal   position; however, these structures were seen on a prior ultrasound and   appeared sonographically normal   The patient was informed of today's   findings and all of her questions were answered  The limitations of   ultrasound were reviewed with the patient  Lbor precautions and fetal   kick counts were reviewed  We talked about the patient's current state of her diabetes  She   continues to maintain close contact with our diabetes in pregnancy program   and she continues to do a good job at controlling her blood sugars  Thank you very much for allowing us to participate in the care of this   very nice patient  Should you have any questions, please do not hesitate   to contact our office        Please note, in addition to the time spent discussing the results of the   ultrasound, I spent approximately 10 minutes of face-to-face time with the patient, greater than 50% of which was spent in counseling and the   coordination of care for this patient  KALEB Gonzalez , KALEB KENDRICK S  JORGE Loaiza     Electronically signed 05/02/17 15:30

## 2018-01-16 NOTE — PROGRESS NOTES
2017         RE: Kaci Part                                To: [facility]   MR#: [id number]                                   [address]   : Darron Weber [address] [facility] [phone]   (Exam #: [id number])      The LMP of this 39year old,  G4, P0-1-2-1 patient was AUG 25 2016, giving   her an IGNACIO of 2017 and a current gestational age of 22 weeks 4 days   by dates  A sonographic examination was performed on 2017 using   real time equipment  The ultrasound examination was performed using   abdominal technique  The patient has a BMI of 39 1  Her blood pressure   today was 127/61        Earliest ultrasound found in her record: 10/25/16 8w4d  17 IGNACIO      Cardiac motion was observed at 144 bpm       INDICATIONS      FOB cardiac defect      Exam Types      Fetal Echocardiogram      RESULTS      Fetus # 1 of 1   Vertex presentation   Placenta Location = Anterior   No placenta previa   Placenta Grade = I      AMNIOTIC FLUID      Q1: 6 5      Q2:          Q3: 2 6      Q4: 4 1   MACK Total = 13 1 cm   Amniotic Fluid: Normal      FETAL VESSELS                                     S/D   PI    RI    PSV   AEDV RF                                                    cm/s       Umbilical Artery                 1 16              No   No       Middle Cerebral Artery           1 79      FETAL VESSELS                                    PVSys PVDia PASys  S/D   S/A   DVI   RF       Ductus Venosus:                                                No      ANATOMY DETAILS      Visualized Appearing Sonographically Normal:   HEART: (Four Chamber View, Proximal Left Outflow, Proximal Right Outflow,   3VV, 3 Vessel Trachea, Short Axis of Greater Vessels, Ductal Arch, Aortic   Arch, Interventricular Septum, Interatrial Septum, IVC, SVC, Cardiac Axis,   Cardiac Position);    SPINE: (Cervical Spine, Thoracic Spine, Lumbar   Spine, Sacrum)      ANATOMY COMMENTS      The prior fetal anatomic survey was limited with respect to evaluation of   the spine  The views of the spine which were limited in the prior anatomic   evaluation  appear normal today, which completes the fetal anatomic   evaluation  IMPRESSION      Fontaine IUP   Vertex presentation   Regular fetal heart rate of 144 bpm   Anterior placenta   No placenta previa      GENERAL COMMENT      Fetal echocardiography was performed for the indication of a family   history of a congenital heart defect  The heart is in normal anatomic   position within the left chest   All four chambers were identified with a   normal, 45-degree leftward axis  There is no suspicion of an echogenic   intracardiac focus  Tricuspid regurgitation is not present  Right and   left ventricular and atrial sizes were concordant  The ventricular and   atrial septi appear intact by 2D echo and color Doppler  The aorta arises   in normal anatomic relationship from the left ventricle  The pulmonary   artery arises in normal anatomic relationship from the right ventricle  The short axis and three vessel views appear normal   The ductus arterosis   joins the aorta in a normal anatomic relationship  The aortic arch,   pulmonary veins, inferior and superior vena cava, and the right and left   ventricular outflow tracts were identified and appear normal  The flow   velocities across the mitral, tricuspid, aortic, pulmonary valves, aortic   arch and ductus arteriosus, appear normal  There is no evidence of an   anatomical defect present within the heart  The fetal heart rate was   regular throughout the exam period  There is no suspicion of a fetal   dysrhythmia  The umbilical and middle cerebral artery, and ductus   venosus, Doppler studies are normal       Today's ultrasound findings were discussed in detail with the patient      She was advised of the findings and counseled about the limitations of   fetal echocardiography in detecting all forms of congenital anomalies  For example, fetal echocardiography may not detect small septal defects   and minor valvular abnormalities  Other examples of difficult    diagnosis include post valvular stenosis, coarctation of the aorta, and   anomalous pulmonary venous return  RECOMMENDATION      Growth Ultrasound: at 32 weeks      KALEB Chaney , R MARGARET KENDRICK S  JORGE Reich     Maternal-Fetal Medicine   Electronically signed 17 18:18

## 2018-01-22 VITALS — BODY MASS INDEX: 37.49 KG/M2 | WEIGHT: 205 LBS | SYSTOLIC BLOOD PRESSURE: 120 MMHG | DIASTOLIC BLOOD PRESSURE: 80 MMHG

## 2018-01-22 VITALS — WEIGHT: 217 LBS | DIASTOLIC BLOOD PRESSURE: 74 MMHG | BODY MASS INDEX: 39.69 KG/M2 | SYSTOLIC BLOOD PRESSURE: 116 MMHG

## 2018-01-22 VITALS — BODY MASS INDEX: 39.87 KG/M2 | WEIGHT: 218 LBS | SYSTOLIC BLOOD PRESSURE: 110 MMHG | DIASTOLIC BLOOD PRESSURE: 68 MMHG

## 2018-01-22 VITALS — HEIGHT: 62 IN | BODY MASS INDEX: 39.56 KG/M2 | WEIGHT: 215 LBS

## 2018-01-22 VITALS — DIASTOLIC BLOOD PRESSURE: 82 MMHG | WEIGHT: 216 LBS | SYSTOLIC BLOOD PRESSURE: 122 MMHG | BODY MASS INDEX: 39.51 KG/M2

## 2018-01-22 VITALS — WEIGHT: 219 LBS | SYSTOLIC BLOOD PRESSURE: 100 MMHG | DIASTOLIC BLOOD PRESSURE: 52 MMHG | BODY MASS INDEX: 40.06 KG/M2

## 2018-01-22 VITALS — DIASTOLIC BLOOD PRESSURE: 78 MMHG | SYSTOLIC BLOOD PRESSURE: 112 MMHG | WEIGHT: 217 LBS | BODY MASS INDEX: 39.69 KG/M2

## 2018-01-22 VITALS — WEIGHT: 217 LBS | SYSTOLIC BLOOD PRESSURE: 118 MMHG | BODY MASS INDEX: 39.69 KG/M2 | DIASTOLIC BLOOD PRESSURE: 80 MMHG

## 2018-03-07 NOTE — PROGRESS NOTES
Education  iodine Education 3rd Trimester: Third Trimester Education provided:   benefits of breastfeeding, importance of exclusive breastfeeding, early initiation of breastfeeding, exclusive breastfeeding for the first 6 months, frequent feedings for optimal milk production, feeding on demand/baby-led feedings, effective positioning and attachment, importance of breastfeeding after 6 months following introduction of other foods, non-pharmacologic pain relief methods for labor, importance of early skin-to-skin contact and 28 week packet given  rooming-in on 24 hour basis Pregnancy Essentials Reference Guide given   The patient is planning on breastfeeding  The patient is planning on exclusively breastfeeding until the baby is 10months of age  Mother has not registered for prenatal class  Thought has been given to selecting a pediatrician  The mother has discussed personal preferences with her provider  Prenatal education provided by: Vivian Jurado PA-C      Signatures   Electronically signed by : LOLLY Campos;  Apr 21 2017 12:24PM EST                       (Author)

## 2018-03-07 NOTE — PROGRESS NOTES
Education  [facility] Education 1st Trimester:   First Trimester Education provided: benefits of breastfeeding, importance of exclusive breastfeeding, early initiation of breastfeeding and exclusive breastfeeding for the first 6 months   The patient is not planning on breastfeeding  Patient is unsure whether she will breastfeed or not - did not breast feed in the past   Prenatal education provided by: Melissa Sequeira PA-C Date: 10/25/16  Signatures   Electronically signed by :  Melissa Sequeira, [facility]; Oct 25 2016  2:35PM EST                       (Author)

## 2018-07-20 ENCOUNTER — OFFICE VISIT (OUTPATIENT)
Dept: URGENT CARE | Facility: MEDICAL CENTER | Age: 38
End: 2018-07-20
Payer: COMMERCIAL

## 2018-07-20 VITALS
RESPIRATION RATE: 20 BRPM | HEIGHT: 62 IN | SYSTOLIC BLOOD PRESSURE: 144 MMHG | DIASTOLIC BLOOD PRESSURE: 84 MMHG | TEMPERATURE: 97.6 F | HEART RATE: 80 BPM | BODY MASS INDEX: 40.48 KG/M2 | OXYGEN SATURATION: 100 % | WEIGHT: 220 LBS

## 2018-07-20 DIAGNOSIS — L25.9 CONTACT DERMATITIS, UNSPECIFIED CONTACT DERMATITIS TYPE, UNSPECIFIED TRIGGER: ICD-10-CM

## 2018-07-20 DIAGNOSIS — J02.9 ACUTE PHARYNGITIS, UNSPECIFIED ETIOLOGY: Primary | ICD-10-CM

## 2018-07-20 PROCEDURE — S9088 SERVICES PROVIDED IN URGENT: HCPCS

## 2018-07-20 PROCEDURE — 99213 OFFICE O/P EST LOW 20 MIN: CPT

## 2018-07-20 RX ORDER — LEVOTHYROXINE SODIUM 0.1 MG/1
TABLET ORAL
COMMUNITY
End: 2018-07-20 | Stop reason: SDUPTHER

## 2018-07-20 RX ORDER — METHYLPREDNISOLONE 4 MG/1
TABLET ORAL
Qty: 21 TABLET | Refills: 0 | Status: SHIPPED | OUTPATIENT
Start: 2018-07-20 | End: 2019-06-18 | Stop reason: ALTCHOICE

## 2018-07-20 NOTE — PROGRESS NOTES
[address] [facility]        NAME: Aaliyah Mcguire is a 43 y o  female  : 1980    MRN: [id number]      Assessment and Plan   Acute pharyngitis, unspecified etiology [J02 9]  1  Acute pharyngitis, unspecified etiology     2  Contact dermatitis, unspecified contact dermatitis type, unspecified trigger  Methylprednisolone 4 MG TBPK         Patient Instructions       Follow up with PCP in 3-5 days  Proceed to  ER if symptoms worsen  Chief Complaint     Chief Complaint   Patient presents with    Sore Throat     Sore throat today  Pt states her throat feels swollen   Rash     Itchy rash covering pt's body x 2-3 weeks  History of Present Illness       Sore Throat    This is a new problem  The current episode started today  The problem has been unchanged  There has been no fever  Associated symptoms include swollen glands  She has had no exposure to strep  She has tried nothing for the symptoms  The treatment provided no relief  Rash   This is a new problem  The current episode started 1 to 4 weeks ago  The problem is unchanged  The affected locations include the abdomen and back  The rash is characterized by dryness, itchiness, redness and scaling  She was exposed to nothing  Associated symptoms include a sore throat  Review of Systems   Review of Systems   Constitutional: Negative  HENT: Positive for sore throat  Respiratory: Negative  Cardiovascular: Negative  Skin: Positive for rash           Current Medications       Current Outpatient Prescriptions:     levothyroxine 100 mcg tablet, Take 100 mcg by mouth daily, Disp: , Rfl:     acetaminophen (TYLENOL) 325 mg tablet, Take 2 tablets by mouth every 4 (four) hours as needed for mild pain, headaches or fever, Disp: 30 tablet, Rfl: 0    benzocaine-menthol-lanolin-aloe (DERMOPLAST) 20-0 5 % topical spray, Apply 1 application topically 4 (four) times a day as needed for mild pain for up to 30 days, Disp: 1 each, Rfl: 0   docusate sodium (COLACE) 100 mg capsule, Take 1 capsule by mouth 2 (two) times a day as needed for constipation for up to 30 days, Disp: 60 capsule, Rfl: 0    hydrocortisone 1 % cream, Apply 1 application topically as needed for irritation for up to 10 days, Disp: 30 g, Rfl: 0    ibuprofen (MOTRIN) 600 mg tablet, Take 1 tablet by mouth every 6 (six) hours as needed for mild pain, moderate pain, fever or headaches for up to 30 days, Disp: 30 tablet, Rfl: 0    Methylprednisolone 4 MG TBPK, Use as directed on package, Disp: 21 tablet, Rfl: 0    Prenatal Vit-Fe Fumarate-FA (PRENATAL VITAMIN PO), Take 1 tablet by mouth daily, Disp: , Rfl:     witch hazel-glycerin (TUCKS) topical pad, Apply 1 pad topically as needed for irritation for up to 30 days, Disp: 40 each, Rfl: 0    Current Allergies     Allergies as of 07/20/2018 - Reviewed 07/20/2018   Allergen Reaction Noted    Penicillins Hives 02/23/2017            The following portions of the patient's history were reviewed and updated as appropriate: allergies, current medications, past family history, past medical history, past social history, past surgical history and problem list      Past Medical History:   Diagnosis Date    Diabetes mellitus ([address] )     gestational insulin    Disease of thyroid gland     hypo    Fatty liver     Hypertension     occ    SVT (supraventricular tachycardia) ([address] )     Varicella     childhood       Past Surgical History:   Procedure Laterality Date    BREAST BIOPSY      CARDIAC ELECTROPHYSIOLOGY STUDY AND ABLATION      GYNECOLOGIC CRYOSURGERY         Family History   Problem Relation Age of Onset    Asthma Mother     Hypertension Mother     Hypothyroidism Mother     Hypothyroidism Sister     Hypothyroidism Maternal Aunt     Hypothyroidism Maternal Grandmother     Diabetes Maternal Grandfather     Mental illness Sister          Medications have been verified          Objective   /84 (BP Location: Right arm, Patient Position: Sitting, Cuff Size: Large)   Pulse 80   Temp 97 6 °F (36 4 °C) (Temporal)   Resp 20   Ht 5' 2" (1 575 m)   Wt 99 8 kg (220 lb)   SpO2 100%   BMI 40 24 kg/m²        Physical Exam     Physical Exam   Constitutional: She is oriented to person, place, and time  She appears well-developed and well-nourished  HENT:   Right Ear: Tympanic membrane and external ear normal    Left Ear: Tympanic membrane and external ear normal    Nose: No rhinorrhea  Mouth/Throat: Uvula is midline and mucous membranes are normal  Posterior oropharyngeal edema and posterior oropharyngeal erythema present  Neck: Normal range of motion  No edema present  Cardiovascular: Normal rate, regular rhythm, S1 normal, S2 normal and normal heart sounds  No murmur heard  Pulmonary/Chest: Effort normal and breath sounds normal  No respiratory distress  She has no wheezes  She has no rales  She exhibits no tenderness  Lymphadenopathy:     She has no cervical adenopathy  Neurological: She is alert and oriented to person, place, and time  Skin: Skin is warm, dry and intact  Rash noted  Rash is maculopapular  There is erythema  Psychiatric: She has a normal mood and affect  Her speech is normal and behavior is normal    Nursing note and vitals reviewed

## 2018-07-20 NOTE — PATIENT INSTRUCTIONS
RST -  Take steroid as directed  Pharyngitis   WHAT YOU NEED TO KNOW:   Pharyngitis, or sore throat, is inflammation of the tissues and structures in your pharynx (throat)  Pharyngitis is most often caused by bacteria  It may also be caused by a cold or flu virus  Other causes include smoking, allergies, or acid reflux  DISCHARGE INSTRUCTIONS:   Call 911 for any of the following:   · You have trouble breathing or swallowing because your throat is swollen or sore  Return to the emergency department if:   · You are drooling because it hurts too much to swallow  · Your fever is higher than 102? F (39?C) or lasts longer than 3 days  · You are confused  · You taste blood in your throat  Contact your healthcare provider if:   · Your throat pain gets worse  · You have a painful lump in your throat that does not go away after 5 days  · Your symptoms do not improve after 5 days  · You have questions or concerns about your condition or care  Medicines:  Viral pharyngitis will go away on its own without treatment  Your sore throat should start to feel better in 3 to 5 days for both viral and bacterial infections  You may need any of the following:  · Antibiotics  treat a bacterial infection  · NSAIDs , such as ibuprofen, help decrease swelling, pain, and fever  NSAIDs can cause stomach bleeding or kidney problems in certain people  If you take blood thinner medicine, always ask your healthcare provider if NSAIDs are safe for you  Always read the medicine label and follow directions  · Acetaminophen  decreases pain and fever  It is available without a doctor's order  Ask how much to take and how often to take it  Follow directions  Acetaminophen can cause liver damage if not taken correctly  · Take your medicine as directed  Contact your healthcare provider if you think your medicine is not helping or if you have side effects  Tell him or her if you are allergic to any medicine   Keep a list of the medicines, vitamins, and herbs you take  Include the amounts, and when and why you take them  Bring the list or the pill bottles to follow-up visits  Carry your medicine list with you in case of an emergency  Manage your symptoms:   · Gargle salt water  Mix ¼ teaspoon salt in an 8 ounce glass of warm water and gargle  This may help decrease swelling in your throat  · Drink liquids as directed  You may need to drink more liquids than usual  Liquids may help soothe your throat and prevent dehydration  Ask how much liquid to drink each day and which liquids are best for you  · Use a cool-steam humidifier  to help moisten the air in your room and calm your cough  · Soothe your throat  with cough drops, ice, soft foods, or popsicles  Prevent the spread of pharyngitis:  Cover your mouth and nose when you cough or sneeze  Do not share food or drinks  Wash your hands often  Use soap and water  If soap and water are unavailable, use an alcohol based hand   Follow up with your healthcare provider as directed:  Write down your questions so you remember to ask them during your visits  © 2017 [address] Information is for End User's use only and may not be sold, redistributed or otherwise used for commercial purposes  All illustrations and images included in CareNotes® are the copyrighted property of [employer]  or Nadir Bailey  The above information is an  only  It is not intended as medical advice for individual conditions or treatments  Talk to your doctor, nurse or pharmacist before following any medical regimen to see if it is safe and effective for you

## 2018-12-02 ENCOUNTER — OFFICE VISIT (OUTPATIENT)
Dept: URGENT CARE | Facility: MEDICAL CENTER | Age: 38
End: 2018-12-02
Payer: COMMERCIAL

## 2018-12-02 VITALS
DIASTOLIC BLOOD PRESSURE: 83 MMHG | HEART RATE: 88 BPM | SYSTOLIC BLOOD PRESSURE: 127 MMHG | BODY MASS INDEX: 38.04 KG/M2 | RESPIRATION RATE: 16 BRPM | TEMPERATURE: 98 F | WEIGHT: 208 LBS

## 2018-12-02 DIAGNOSIS — H01.001 BLEPHARITIS OF RIGHT UPPER EYELID, UNSPECIFIED TYPE: Primary | ICD-10-CM

## 2018-12-02 PROCEDURE — S9088 SERVICES PROVIDED IN URGENT: HCPCS | Performed by: PHYSICIAN ASSISTANT

## 2018-12-02 PROCEDURE — 99213 OFFICE O/P EST LOW 20 MIN: CPT | Performed by: PHYSICIAN ASSISTANT

## 2018-12-02 RX ORDER — OLOPATADINE HYDROCHLORIDE 2 MG/ML
1 SOLUTION/ DROPS OPHTHALMIC DAILY
Qty: 10 ML | Refills: 0 | Status: SHIPPED | OUTPATIENT
Start: 2018-12-02 | End: 2019-06-18 | Stop reason: ALTCHOICE

## 2018-12-02 NOTE — PATIENT INSTRUCTIONS
1  Cool compresses to eyes 5-8 min 3-4x daily as needed for comfort  2  Use Pataday 1 drop into each eye daily as needed for itching  3   Follow up with PCP in 3-5 days if symptoms persist

## 2018-12-02 NOTE — PROGRESS NOTES
[facility]        NAME: Tejinder Umana is a 43 y o  female  : 1980    MRN: [id number]  DATE: 2018  TIME: 11:20 AM    Assessment and Plan   Blepharitis of right upper eyelid, unspecified type [H01 001]  1  Blepharitis of right upper eyelid, unspecified type  olopatadine HCl (PATADAY) 0 2 % opth drops         Patient Instructions     1  Cool compresses to eyes 5-8 min 3-4x daily as needed for comfort  2  Use Pataday 1 drop into each eye daily as needed for itching  3  Follow up with PCP in 3-5 days if symptoms persist      Chief Complaint     Chief Complaint   Patient presents with    Eye Problem     feels eyelids are swollen and eyes itch, slight discharge         History of Present Illness       The patient is a 77-year-old female presents with bilateral eyelid redness, swelling and itchiness  She denies any ocular drainage or pain, she denies any nasal symptoms, congestion or fever  Review of Systems   Review of Systems   Constitutional: Negative  HENT: Negative  Eyes: Positive for itching  Negative for photophobia, discharge, redness and visual disturbance           Current Medications       Current Outpatient Prescriptions:     acetaminophen (TYLENOL) 325 mg tablet, Take 2 tablets by mouth every 4 (four) hours as needed for mild pain, headaches or fever, Disp: 30 tablet, Rfl: 0    levothyroxine 100 mcg tablet, Take 100 mcg by mouth daily, Disp: , Rfl:     benzocaine-menthol-lanolin-aloe (DERMOPLAST) 20-0 5 % topical spray, Apply 1 application topically 4 (four) times a day as needed for mild pain for up to 30 days, Disp: 1 each, Rfl: 0    docusate sodium (COLACE) 100 mg capsule, Take 1 capsule by mouth 2 (two) times a day as needed for constipation for up to 30 days, Disp: 60 capsule, Rfl: 0    hydrocortisone 1 % cream, Apply 1 application topically as needed for irritation for up to 10 days, Disp: 30 g, Rfl: 0    ibuprofen (MOTRIN) 600 mg tablet, Take 1 tablet by mouth every 6 (six) hours as needed for mild pain, moderate pain, fever or headaches for up to 30 days, Disp: 30 tablet, Rfl: 0    Methylprednisolone 4 MG TBPK, Use as directed on package (Patient not taking: Reported on 12/2/2018 ), Disp: 21 tablet, Rfl: 0    olopatadine HCl (PATADAY) 0 2 % opth drops, Administer 1 drop to both eyes daily, Disp: 10 mL, Rfl: 0    Prenatal Vit-Fe Fumarate-FA (PRENATAL VITAMIN PO), Take 1 tablet by mouth daily, Disp: , Rfl:     witch hazel-glycerin (TUCKS) topical pad, Apply 1 pad topically as needed for irritation for up to 30 days, Disp: 40 each, Rfl: 0    Current Allergies     Allergies as of 12/02/2018 - Reviewed 12/02/2018   Allergen Reaction Noted    Penicillins Hives 02/23/2017            The following portions of the patient's history were reviewed and updated as appropriate: allergies, current medications, past family history, past medical history, past social history, past surgical history and problem list      Past Medical History:   Diagnosis Date    Diabetes mellitus ([address] )     gestational insulin    Disease of thyroid gland     hypo    Fatty liver     Hypertension     occ    SVT (supraventricular tachycardia) ([address] )     Varicella     childhood       Past Surgical History:   Procedure Laterality Date    BREAST BIOPSY      CARDIAC ELECTROPHYSIOLOGY STUDY AND ABLATION      GYNECOLOGIC CRYOSURGERY         Family History   Problem Relation Age of Onset    Asthma Mother     Hypertension Mother     Hypothyroidism Mother     Hypothyroidism Sister     Hypothyroidism Maternal Aunt     Hypothyroidism Maternal Grandmother     Diabetes Maternal Grandfather     Mental illness Sister          Medications have been verified          Objective   /83 (Patient Position: Sitting)   Pulse 88   Temp 98 °F (36 7 °C) (Temporal)   Resp 16   Wt 94 3 kg (208 lb)   BMI 38 04 kg/m²        Physical Exam     Physical Exam   Constitutional: She appears well-developed and well-nourished  No distress  HENT:   Head: Normocephalic and atraumatic  Right Ear: Tympanic membrane and ear canal normal    Left Ear: Tympanic membrane and ear canal normal    Nose: Nose normal    Mouth/Throat: Uvula is midline, oropharynx is clear and moist and mucous membranes are normal    Eyes: Pupils are equal, round, and reactive to light  Conjunctivae and EOM are normal  Right eye exhibits no chemosis, no discharge and no hordeolum  Cardiovascular: Normal rate, regular rhythm and normal heart sounds  No murmur heard    Pulmonary/Chest: Effort normal and breath sounds normal

## 2018-12-10 ENCOUNTER — HOSPITAL ENCOUNTER (EMERGENCY)
Facility: HOSPITAL | Age: 38
Discharge: HOME | End: 2018-12-10
Attending: EMERGENCY MEDICINE | Admitting: EMERGENCY MEDICINE
Payer: COMMERCIAL

## 2018-12-10 ENCOUNTER — APPOINTMENT (EMERGENCY)
Dept: CT IMAGING | Facility: HOSPITAL | Age: 38
End: 2018-12-10
Payer: COMMERCIAL

## 2018-12-10 VITALS
HEIGHT: 62 IN | RESPIRATION RATE: 20 BRPM | BODY MASS INDEX: 38.34 KG/M2 | HEART RATE: 93 BPM | WEIGHT: 208.34 LBS | OXYGEN SATURATION: 100 % | SYSTOLIC BLOOD PRESSURE: 166 MMHG | DIASTOLIC BLOOD PRESSURE: 84 MMHG

## 2018-12-10 DIAGNOSIS — R10.31 RLQ ABDOMINAL PAIN: Primary | ICD-10-CM

## 2018-12-10 LAB
ALBUMIN SERPL BCP-MCNC: 4 G/DL (ref 3.5–5)
ALP SERPL-CCNC: 70 U/L (ref 46–116)
ALT SERPL W P-5'-P-CCNC: 29 U/L (ref 12–78)
ANION GAP SERPL CALCULATED.3IONS-SCNC: 9 MMOL/L (ref 4–13)
AST SERPL W P-5'-P-CCNC: 11 U/L (ref 5–45)
BACTERIA UR QL AUTO: ABNORMAL /HPF
BASOPHILS # BLD AUTO: 0.05 THOUSANDS/ΜL (ref 0–0.1)
BASOPHILS NFR BLD AUTO: 0 % (ref 0–1)
BILIRUB SERPL-MCNC: 0.3 MG/DL (ref 0.2–1)
BILIRUB UR QL STRIP: NEGATIVE
BUN SERPL-MCNC: 17 MG/DL (ref 5–25)
CALCIUM SERPL-MCNC: 8.7 MG/DL (ref 8.3–10.1)
CHLORIDE SERPL-SCNC: 103 MMOL/L (ref 100–108)
CLARITY UR: CLEAR
CO2 SERPL-SCNC: 28 MMOL/L (ref 21–32)
COLOR UR: YELLOW
CREAT SERPL-MCNC: 0.93 MG/DL (ref 0.6–1.3)
EOSINOPHIL # BLD AUTO: 0.18 THOUSAND/ΜL (ref 0–0.61)
EOSINOPHIL NFR BLD AUTO: 2 % (ref 0–6)
ERYTHROCYTE [DISTWIDTH] IN BLOOD BY AUTOMATED COUNT: 13.6 % (ref 11.6–15.1)
EXT PREG TEST URINE: NEGATIVE
GFR SERPL CREATININE-BSD FRML MDRD: 78 ML/MIN/1.73SQ M
GLUCOSE SERPL-MCNC: 98 MG/DL (ref 65–140)
GLUCOSE UR STRIP-MCNC: NEGATIVE MG/DL
HCT VFR BLD AUTO: 42.2 % (ref 34.8–46.1)
HGB BLD-MCNC: 14.1 G/DL (ref 11.5–15.4)
HGB UR QL STRIP.AUTO: NEGATIVE
IMM GRANULOCYTES # BLD AUTO: 0.06 THOUSAND/UL (ref 0–0.2)
IMM GRANULOCYTES NFR BLD AUTO: 1 % (ref 0–2)
KETONES UR STRIP-MCNC: NEGATIVE MG/DL
LEUKOCYTE ESTERASE UR QL STRIP: ABNORMAL
LIPASE SERPL-CCNC: 149 U/L (ref 73–393)
LYMPHOCYTES # BLD AUTO: 2.05 THOUSANDS/ΜL (ref 0.6–4.47)
LYMPHOCYTES NFR BLD AUTO: 17 % (ref 14–44)
MCH RBC QN AUTO: 28.7 PG (ref 26.8–34.3)
MCHC RBC AUTO-ENTMCNC: 33.4 G/DL (ref 31.4–37.4)
MCV RBC AUTO: 86 FL (ref 82–98)
MONOCYTES # BLD AUTO: 0.81 THOUSAND/ΜL (ref 0.17–1.22)
MONOCYTES NFR BLD AUTO: 7 % (ref 4–12)
NEUTROPHILS # BLD AUTO: 9.14 THOUSANDS/ΜL (ref 1.85–7.62)
NEUTS SEG NFR BLD AUTO: 73 % (ref 43–75)
NITRITE UR QL STRIP: NEGATIVE
NON-SQ EPI CELLS URNS QL MICRO: ABNORMAL /HPF
NRBC BLD AUTO-RTO: 0 /100 WBCS
PH UR STRIP.AUTO: 7.5 [PH] (ref 4.5–8)
PLATELET # BLD AUTO: 330 THOUSANDS/UL (ref 149–390)
PMV BLD AUTO: 9.8 FL (ref 8.9–12.7)
POTASSIUM SERPL-SCNC: 3.8 MMOL/L (ref 3.5–5.3)
PROT SERPL-MCNC: 7.4 G/DL (ref 6.4–8.2)
PROT UR STRIP-MCNC: NEGATIVE MG/DL
RBC # BLD AUTO: 4.92 MILLION/UL (ref 3.81–5.12)
RBC #/AREA URNS AUTO: ABNORMAL /HPF
SODIUM SERPL-SCNC: 140 MMOL/L (ref 136–145)
SP GR UR STRIP.AUTO: 1.02 (ref 1–1.03)
UROBILINOGEN UR QL STRIP.AUTO: 0.2 E.U./DL
WBC # BLD AUTO: 12.29 THOUSAND/UL (ref 4.31–10.16)
WBC #/AREA URNS AUTO: ABNORMAL /HPF

## 2018-12-10 PROCEDURE — 96374 THER/PROPH/DIAG INJ IV PUSH: CPT

## 2018-12-10 PROCEDURE — 96375 TX/PRO/DX INJ NEW DRUG ADDON: CPT

## 2018-12-10 PROCEDURE — 85025 COMPLETE CBC W/AUTO DIFF WBC: CPT | Performed by: EMERGENCY MEDICINE

## 2018-12-10 PROCEDURE — 36415 COLL VENOUS BLD VENIPUNCTURE: CPT | Performed by: EMERGENCY MEDICINE

## 2018-12-10 PROCEDURE — 74177 CT ABD & PELVIS W/CONTRAST: CPT

## 2018-12-10 PROCEDURE — 99284 EMERGENCY DEPT VISIT MOD MDM: CPT

## 2018-12-10 PROCEDURE — 81025 URINE PREGNANCY TEST: CPT | Performed by: EMERGENCY MEDICINE

## 2018-12-10 PROCEDURE — 83690 ASSAY OF LIPASE: CPT | Performed by: EMERGENCY MEDICINE

## 2018-12-10 PROCEDURE — 80053 COMPREHEN METABOLIC PANEL: CPT | Performed by: EMERGENCY MEDICINE

## 2018-12-10 PROCEDURE — 81001 URINALYSIS AUTO W/SCOPE: CPT

## 2018-12-10 RX ORDER — KETOROLAC TROMETHAMINE 30 MG/ML
15 INJECTION, SOLUTION INTRAMUSCULAR; INTRAVENOUS ONCE
Status: COMPLETED | OUTPATIENT
Start: 2018-12-10 | End: 2018-12-10

## 2018-12-10 RX ORDER — HYDROMORPHONE HCL/PF 1 MG/ML
0.5 SYRINGE (ML) INJECTION ONCE
Status: COMPLETED | OUTPATIENT
Start: 2018-12-10 | End: 2018-12-10

## 2018-12-10 RX ORDER — ONDANSETRON 2 MG/ML
4 INJECTION INTRAMUSCULAR; INTRAVENOUS ONCE
Status: COMPLETED | OUTPATIENT
Start: 2018-12-10 | End: 2018-12-10

## 2018-12-10 RX ADMIN — IOHEXOL 100 ML: 350 INJECTION, SOLUTION INTRAVENOUS at 17:27

## 2018-12-10 RX ADMIN — KETOROLAC TROMETHAMINE 15 MG: 30 INJECTION, SOLUTION INTRAMUSCULAR at 18:38

## 2018-12-10 RX ADMIN — ONDANSETRON 4 MG: 2 INJECTION INTRAMUSCULAR; INTRAVENOUS at 16:37

## 2018-12-10 RX ADMIN — HYDROMORPHONE HYDROCHLORIDE 0.5 MG: 1 INJECTION, SOLUTION INTRAMUSCULAR; INTRAVENOUS; SUBCUTANEOUS at 16:37

## 2018-12-10 NOTE — ED PROVIDER NOTES
History  Chief Complaint   Patient presents with    Abdominal Pain     RLQ persistent nagging pulsing pain, reported nausea with no vomiting, lower back pain, no hx of cycts  History provided by:  Patient   used: No    Abdominal Pain   Associated symptoms: nausea    Associated symptoms: no dysuria, no fatigue, no fever, no shortness of breath and no vomiting     50-year-old female presented with focal, consistent right lower quadrant pain over the last 2 days  She started feeling on well 3 days ago  She has had some associated nausea, loss of appetite without vomiting  Had a normal bowel movement this morning  She reports some radiation to the low back  No fever, chills, urinary complaints  LMP 2 weeks ago  Slight RLQ tenderness on exam  No rebound/guarding  DDx includes acute appendicitis, ovarian cyst, viral illness, pyelonephritis  Plan labs, urine, CT, pain control, re-eval     Prior to Admission Medications   Prescriptions Last Dose Informant Patient Reported? Taking?    Methylprednisolone 4 MG TBPK   No Yes   Sig: Use as directed on package   Prenatal Vit-Fe Fumarate-FA (PRENATAL VITAMIN PO)   Yes Yes   Sig: Take 1 tablet by mouth daily   acetaminophen (TYLENOL) 325 mg tablet   No Yes   Sig: Take 2 tablets by mouth every 4 (four) hours as needed for mild pain, headaches or fever   benzocaine-menthol-lanolin-aloe (DERMOPLAST) 20-0 5 % topical spray   No No   Sig: Apply 1 application topically 4 (four) times a day as needed for mild pain for up to 30 days   docusate sodium (COLACE) 100 mg capsule   No No   Sig: Take 1 capsule by mouth 2 (two) times a day as needed for constipation for up to 30 days   hydrocortisone 1 % cream   No No   Sig: Apply 1 application topically as needed for irritation for up to 10 days   ibuprofen (MOTRIN) 600 mg tablet   No No   Sig: Take 1 tablet by mouth every 6 (six) hours as needed for mild pain, moderate pain, fever or headaches for up to 30 days levothyroxine 100 mcg tablet   Yes Yes   Sig: Take 100 mcg by mouth daily   olopatadine HCl (PATADAY) 0 2 % opth drops   No Yes   Sig: Administer 1 drop to both eyes daily   witch hazel-glycerin (TUCKS) topical pad   No No   Sig: Apply 1 pad topically as needed for irritation for up to 30 days      Facility-Administered Medications: None       Past Medical History:   Diagnosis Date    Diabetes mellitus ([facility] )     gestational insulin    Disease of thyroid gland     hypo    Fatty liver     Hypertension     occ    SVT (supraventricular tachycardia) ([facility] )     Varicella     childhood       Past Surgical History:   Procedure Laterality Date    BREAST BIOPSY      CARDIAC ELECTROPHYSIOLOGY STUDY AND ABLATION      GYNECOLOGIC CRYOSURGERY         Family History   Problem Relation Age of Onset    Asthma Mother     Hypertension Mother     Hypothyroidism Mother     Hypothyroidism Sister     Hypothyroidism Maternal Aunt     Hypothyroidism Maternal Grandmother     Diabetes Maternal Grandfather     Mental illness Sister      I have reviewed and agree with the history as documented  Social History   Substance Use Topics    Smoking status: Former Smoker    Smokeless tobacco: Never Used    Alcohol use No        Review of Systems   Constitutional: Positive for appetite change  Negative for activity change, fatigue and fever  Respiratory: Negative for chest tightness and shortness of breath  Gastrointestinal: Positive for abdominal pain and nausea  Negative for vomiting  Genitourinary: Negative for difficulty urinating and dysuria  Musculoskeletal: Negative for back pain and neck pain  Neurological: Negative for dizziness and light-headedness  All other systems reviewed and are negative  Physical Exam  Physical Exam   Constitutional: She is oriented to person, place, and time  She appears well-developed and well-nourished  No distress  HENT:   Head: Normocephalic     Mouth/Throat: Oropharynx is clear and moist    Cardiovascular: Normal rate and regular rhythm  Pulmonary/Chest: Effort normal  No respiratory distress  Abdominal: Soft  She exhibits no distension  Mild RLQ tenderness  No rebound/guarding  Musculoskeletal: Normal range of motion  She exhibits no edema  Neurological: She is alert and oriented to person, place, and time  Skin: Skin is warm and dry  Psychiatric: She has a normal mood and affect  Her behavior is normal    Nursing note and vitals reviewed        Vital Signs  ED Triage Vitals [12/10/18 1600]   Temp Pulse Respirations Blood Pressure SpO2   -- 93 20 166/84 100 %      Temp src Heart Rate Source Patient Position - Orthostatic VS BP Location FiO2 (%)   -- Monitor Sitting Right arm --      Pain Score       5           Vitals:    12/10/18 1600   BP: 166/84   Pulse: 93   Patient Position - Orthostatic VS: Sitting       Visual Acuity      ED Medications  Medications   ondansetron (ZOFRAN) injection 4 mg (4 mg Intravenous Given 12/10/18 1637)   HYDROmorphone (DILAUDID) injection 0 5 mg (0 5 mg Intravenous Given 12/10/18 1637)   iohexol (OMNIPAQUE) 350 MG/ML injection (MULTI-DOSE) 100 mL (100 mL Intravenous Given 12/10/18 1727)   ketorolac (TORADOL) injection 15 mg (15 mg Intravenous Given 12/10/18 1838)       Diagnostic Studies  Results Reviewed     Procedure Component Value Units Date/Time    Urine Microscopic [[id number]]  (Abnormal) Collected:  12/10/18 1641    Lab Status:  Final result Specimen:  Urine from Urine, Clean Catch Updated:  12/10/18 1705     RBC, UA None Seen /hpf      WBC, UA 0-1 (A) /hpf      Epithelial Cells Occasional /hpf      Bacteria, UA Occasional /hpf     Comprehensive metabolic panel [[id number]] Collected:  12/10/18 1637    Lab Status:  Final result Specimen:  Blood from Arm, Left Updated:  12/10/18 1702     Sodium 140 mmol/L      Potassium 3 8 mmol/L      Chloride 103 mmol/L      CO2 28 mmol/L      ANION GAP 9 mmol/L      BUN 17 mg/dL      Creatinine 0 93 mg/dL      Glucose 98 mg/dL      Calcium 8 7 mg/dL      AST 11 U/L      ALT 29 U/L      Alkaline Phosphatase 70 U/L      Total Protein 7 4 g/dL      Albumin 4 0 g/dL      Total Bilirubin 0 30 mg/dL      eGFR 78 ml/min/1 73sq m     Narrative:         National Kidney Disease Education Program recommendations are as follows:  GFR calculation is accurate only with a steady state creatinine  Chronic Kidney disease less than 60 ml/min/1 73 sq  meters  Kidney failure less than 15 ml/min/1 73 sq  meters      Lipase [[id number]]  (Normal) Collected:  12/10/18 1637    Lab Status:  Final result Specimen:  Blood from Arm, Left Updated:  12/10/18 1702     Lipase 149 u/L     POCT pregnancy, urine [[id number]]  (Normal) Resulted:  12/10/18 1652    Lab Status:  Final result Updated:  12/10/18 1652     EXT PREG TEST UR (Ref: Negative) Negative    CBC and differential [[id number]]  (Abnormal) Collected:  12/10/18 1637    Lab Status:  Final result Specimen:  Blood from Arm, Left Updated:  12/10/18 1645     WBC 12 29 (H) Thousand/uL      RBC 4 92 Million/uL      Hemoglobin 14 1 g/dL      Hematocrit 42 2 %      MCV 86 fL      MCH 28 7 pg      MCHC 33 4 g/dL      RDW 13 6 %      MPV 9 8 fL      Platelets 862 Thousands/uL      nRBC 0 /100 WBCs      Neutrophils Relative 73 %      Immat GRANS % 1 %      Lymphocytes Relative 17 %      Monocytes Relative 7 %      Eosinophils Relative 2 %      Basophils Relative 0 %      Neutrophils Absolute 9 14 (H) Thousands/µL      Immature Grans Absolute 0 06 Thousand/uL      Lymphocytes Absolute 2 05 Thousands/µL      Monocytes Absolute 0 81 Thousand/µL      Eosinophils Absolute 0 18 Thousand/µL      Basophils Absolute 0 05 Thousands/µL     ED Urine Macroscopic [[id number]]  (Abnormal) Collected:  12/10/18 1641    Lab Status:  Final result Specimen:  Urine Updated:  12/10/18 1643     Color, UA Yellow     Clarity, UA Clear     pH, UA 7 5     Leukocytes, UA Trace (A)     Nitrite, UA Negative     Protein, UA Negative mg/dl      Glucose, UA Negative mg/dl      Ketones, UA Negative mg/dl      Urobilinogen, UA 0 2 E U /dl      Bilirubin, UA Negative     Blood, UA Negative     Specific Gravity, UA 1 020    Narrative:       CLINITEK RESULT                 CT abdomen pelvis with contrast   Final Result by So Novak MD (12/10 1806)      No evidence of acute appendicitis or other acute abnormality to account for the patient's symptoms  Workstation performed: [id number]                    Procedures  Procedures       Phone Contacts  ED Phone Contact    ED Course                               MDM  Number of Diagnoses or Management Options  RLQ abdominal pain: new and requires workup  Diagnosis management comments: 44 y/o female with 2-3 days of RLQ discomfort with some radiation to the back  Mild tenderness on exam and leukocytosis on labs  CT normal  No appendicitis, ovarian pathology  Could be viral illness, menstrual pain  Stable for d/c home  To return with any worsening sx  Amount and/or Complexity of Data Reviewed  Clinical lab tests: ordered and reviewed  Tests in the radiology section of CPT®: reviewed and ordered    Patient Progress  Patient progress: stable    CritCare Time    Disposition  Final diagnoses:   RLQ abdominal pain     Time reflects when diagnosis was documented in both MDM as applicable and the Disposition within this note     Time User Action Codes Description Comment    12/10/2018  6:49 PM Yuri [address]  [R10 31] RLQ abdominal pain       ED Disposition     ED Disposition Condition Comment    Discharge  [place] discharge to home/self care  Condition at discharge: Stable        Follow-up Information     Follow up With Specialties Details Why Contact Info Additional Mark Christopher DO Family Medicine   [address] [facility]   [address] [facility]  [phone] [facility], [address] [facility] [address] [facility], [place], [place], [facility]  If symptoms worsen [address] [address]  [phone] [facility], [address], [place], [place], [address]          Discharge Medication List as of 12/10/2018  6:50 PM      CONTINUE these medications which have NOT CHANGED    Details   acetaminophen (TYLENOL) 325 mg tablet Take 2 tablets by mouth every 4 (four) hours as needed for mild pain, headaches or fever, Starting 5/9/2017, Until Discontinued, Print      levothyroxine 100 mcg tablet Take 100 mcg by mouth daily, Until Discontinued, Historical Med      Methylprednisolone 4 MG TBPK Use as directed on package, Normal      olopatadine HCl (PATADAY) 0 2 % opth drops Administer 1 drop to both eyes daily, Starting Sun 12/2/2018, Normal      Prenatal Vit-Fe Fumarate-FA (PRENATAL VITAMIN PO) Take 1 tablet by mouth daily, Until Discontinued, Historical Med      benzocaine-menthol-lanolin-aloe (DERMOPLAST) 20-0 5 % topical spray Apply 1 application topically 4 (four) times a day as needed for mild pain for up to 30 days, Starting 5/9/2017, Until Thu 6/8/17, Print      docusate sodium (COLACE) 100 mg capsule Take 1 capsule by mouth 2 (two) times a day as needed for constipation for up to 30 days, Starting 5/9/2017, Until Thu 6/8/17, Print      hydrocortisone 1 % cream Apply 1 application topically as needed for irritation for up to 10 days, Starting 5/9/2017, Until Fri 5/19/17, Print      ibuprofen (MOTRIN) 600 mg tablet Take 1 tablet by mouth every 6 (six) hours as needed for mild pain, moderate pain, fever or headaches for up to 30 days, Starting 5/9/2017, Until Thu 6/8/17, Print      witch hazel-glycerin (TUCKS) topical pad Apply 1 pad topically as needed for irritation for up to 30 days, Starting 5/9/2017, Until Thu 6/8/17, Print           No discharge procedures on file      ED Provider  Electronically Signed by           Mona Dominique MD  12/28/18 7606

## 2018-12-10 NOTE — DISCHARGE INSTRUCTIONS
Abdominal Pain   WHAT YOU NEED TO KNOW:   Abdominal pain can be dull, achy, or sharp  You may have pain in one area of your abdomen, or in your entire abdomen  Your pain may be caused by a condition such as constipation, food sensitivity or poisoning, infection, or a blockage  Abdominal pain can also be from a hernia, appendicitis, or an ulcer  Liver, gallbladder, or kidney conditions can also cause abdominal pain  The cause of your abdominal pain may be unknown  DISCHARGE INSTRUCTIONS:   Return to the emergency department if:   · You have new chest pain or shortness of breath  · You have pulsing pain in your upper abdomen or lower back that suddenly becomes constant  · Your pain is in the right lower abdominal area and worsens with movement  · You have a fever over 100 4°F (38°C) or shaking chills  · You are vomiting and cannot keep food or liquids down  · Your pain does not improve or gets worse over the next 8 to 12 hours  · You see blood in your vomit or bowel movements, or they look black and tarry  · Your skin or the whites of your eyes turn yellow  · You are a woman and have a large amount of vaginal bleeding that is not your monthly period  Contact your healthcare provider if:   · You have pain in your lower back  · You are a man and have pain in your testicles  · You have pain when you urinate  · You have questions or concerns about your condition or care  Follow up with your healthcare provider within 24 hours or as directed:  Write down your questions so you remember to ask them during your visits  Medicines:   · Medicines  may be given to calm your stomach and prevent vomiting or to decrease pain  Ask how to take pain medicine safely  · Take your medicine as directed  Contact your healthcare provider if you think your medicine is not helping or if you have side effects  Tell him of her if you are allergic to any medicine   Keep a list of the medicines, vitamins, and herbs you take  Include the amounts, and when and why you take them  Bring the list or the pill bottles to follow-up visits  Carry your medicine list with you in case of an emergency  © 2017 [employer] Information is for End User's use only and may not be sold, redistributed or otherwise used for commercial purposes  All illustrations and images included in CareNotes® are the copyrighted property of A [employer]  or Nadir Bailey  The above information is an  only  It is not intended as medical advice for individual conditions or treatments  Talk to your doctor, nurse or pharmacist before following any medical regimen to see if it is safe and effective for you

## 2018-12-11 NOTE — ED NOTES
Discharge instruction given to patient  No questions or concerns at this time  Patient able to ambulate at this time        Zeny Ohara RN  12/10/18 1578

## 2019-06-18 ENCOUNTER — OFFICE VISIT (OUTPATIENT)
Dept: FAMILY MEDICINE CLINIC | Facility: MEDICAL CENTER | Age: 39
End: 2019-06-18

## 2019-06-18 ENCOUNTER — APPOINTMENT (OUTPATIENT)
Dept: LAB | Facility: MEDICAL CENTER | Age: 39
End: 2019-06-18
Payer: COMMERCIAL

## 2019-06-18 VITALS
WEIGHT: 210.38 LBS | SYSTOLIC BLOOD PRESSURE: 134 MMHG | DIASTOLIC BLOOD PRESSURE: 80 MMHG | BODY MASS INDEX: 37.28 KG/M2 | RESPIRATION RATE: 16 BRPM | HEIGHT: 63 IN | HEART RATE: 80 BPM

## 2019-06-18 DIAGNOSIS — D72.829 LEUKOCYTOSIS, UNSPECIFIED TYPE: ICD-10-CM

## 2019-06-18 DIAGNOSIS — Z13.220 SCREENING FOR LIPID DISORDERS: ICD-10-CM

## 2019-06-18 DIAGNOSIS — E66.9 CLASS 2 OBESITY WITHOUT SERIOUS COMORBIDITY WITH BODY MASS INDEX (BMI) OF 37.0 TO 37.9 IN ADULT, UNSPECIFIED OBESITY TYPE: ICD-10-CM

## 2019-06-18 DIAGNOSIS — E03.9 HYPOTHYROIDISM, UNSPECIFIED TYPE: Primary | ICD-10-CM

## 2019-06-18 DIAGNOSIS — L85.3 DRY SKIN: ICD-10-CM

## 2019-06-18 DIAGNOSIS — M54.50 CHRONIC BILATERAL LOW BACK PAIN WITHOUT SCIATICA: ICD-10-CM

## 2019-06-18 DIAGNOSIS — M25.552 PAIN OF BOTH HIP JOINTS: ICD-10-CM

## 2019-06-18 DIAGNOSIS — E03.9 HYPOTHYROIDISM, UNSPECIFIED TYPE: ICD-10-CM

## 2019-06-18 DIAGNOSIS — G89.29 CHRONIC BILATERAL LOW BACK PAIN WITHOUT SCIATICA: ICD-10-CM

## 2019-06-18 DIAGNOSIS — R20.0 NUMBNESS AND TINGLING OF FOOT: ICD-10-CM

## 2019-06-18 DIAGNOSIS — R20.2 PARESTHESIA OF BOTH HANDS: ICD-10-CM

## 2019-06-18 DIAGNOSIS — O24.419 GESTATIONAL DIABETES MELLITUS (GDM), ANTEPARTUM, GESTATIONAL DIABETES METHOD OF CONTROL UNSPECIFIED: ICD-10-CM

## 2019-06-18 DIAGNOSIS — F41.9 ANXIETY: ICD-10-CM

## 2019-06-18 DIAGNOSIS — M25.551 PAIN OF BOTH HIP JOINTS: ICD-10-CM

## 2019-06-18 DIAGNOSIS — R20.2 NUMBNESS AND TINGLING OF FOOT: ICD-10-CM

## 2019-06-18 PROBLEM — Z3A.36 36 WEEKS GESTATION OF PREGNANCY: Status: RESOLVED | Noted: 2017-05-07 | Resolved: 2019-06-18

## 2019-06-18 PROBLEM — O09.529 ADVANCED MATERNAL AGE IN MULTIGRAVIDA: Status: RESOLVED | Noted: 2017-05-07 | Resolved: 2019-06-18

## 2019-06-18 LAB
ALBUMIN SERPL BCP-MCNC: 3.9 G/DL (ref 3.5–5)
ALP SERPL-CCNC: 67 U/L (ref 46–116)
ALT SERPL W P-5'-P-CCNC: 38 U/L (ref 12–78)
ANION GAP SERPL CALCULATED.3IONS-SCNC: 5 MMOL/L (ref 4–13)
AST SERPL W P-5'-P-CCNC: 18 U/L (ref 5–45)
BASOPHILS # BLD AUTO: 0.04 THOUSANDS/ΜL (ref 0–0.1)
BASOPHILS NFR BLD AUTO: 1 % (ref 0–1)
BILIRUB SERPL-MCNC: 0.61 MG/DL (ref 0.2–1)
BUN SERPL-MCNC: 17 MG/DL (ref 5–25)
CALCIUM SERPL-MCNC: 8.9 MG/DL (ref 8.3–10.1)
CHLORIDE SERPL-SCNC: 104 MMOL/L (ref 100–108)
CHOLEST SERPL-MCNC: 164 MG/DL (ref 50–200)
CO2 SERPL-SCNC: 26 MMOL/L (ref 21–32)
CREAT SERPL-MCNC: 0.71 MG/DL (ref 0.6–1.3)
EOSINOPHIL # BLD AUTO: 0.15 THOUSAND/ΜL (ref 0–0.61)
EOSINOPHIL NFR BLD AUTO: 2 % (ref 0–6)
ERYTHROCYTE [DISTWIDTH] IN BLOOD BY AUTOMATED COUNT: 13.5 % (ref 11.6–15.1)
EST. AVERAGE GLUCOSE BLD GHB EST-MCNC: 120 MG/DL
GFR SERPL CREATININE-BSD FRML MDRD: 108 ML/MIN/1.73SQ M
GLUCOSE SERPL-MCNC: 92 MG/DL (ref 65–140)
HBA1C MFR BLD: 5.8 % (ref 4.2–6.3)
HCT VFR BLD AUTO: 44.3 % (ref 34.8–46.1)
HDLC SERPL-MCNC: 34 MG/DL (ref 40–60)
HGB BLD-MCNC: 14.2 G/DL (ref 11.5–15.4)
IMM GRANULOCYTES # BLD AUTO: 0.05 THOUSAND/UL (ref 0–0.2)
IMM GRANULOCYTES NFR BLD AUTO: 1 % (ref 0–2)
LDLC SERPL CALC-MCNC: 96 MG/DL (ref 0–100)
LYMPHOCYTES # BLD AUTO: 1.96 THOUSANDS/ΜL (ref 0.6–4.47)
LYMPHOCYTES NFR BLD AUTO: 22 % (ref 14–44)
MCH RBC QN AUTO: 28.2 PG (ref 26.8–34.3)
MCHC RBC AUTO-ENTMCNC: 32.1 G/DL (ref 31.4–37.4)
MCV RBC AUTO: 88 FL (ref 82–98)
MONOCYTES # BLD AUTO: 0.7 THOUSAND/ΜL (ref 0.17–1.22)
MONOCYTES NFR BLD AUTO: 8 % (ref 4–12)
NEUTROPHILS # BLD AUTO: 5.85 THOUSANDS/ΜL (ref 1.85–7.62)
NEUTS SEG NFR BLD AUTO: 66 % (ref 43–75)
NRBC BLD AUTO-RTO: 0 /100 WBCS
PLATELET # BLD AUTO: 311 THOUSANDS/UL (ref 149–390)
PMV BLD AUTO: 11.1 FL (ref 8.9–12.7)
POTASSIUM SERPL-SCNC: 4.1 MMOL/L (ref 3.5–5.3)
PROT SERPL-MCNC: 7.4 G/DL (ref 6.4–8.2)
RBC # BLD AUTO: 5.03 MILLION/UL (ref 3.81–5.12)
SODIUM SERPL-SCNC: 135 MMOL/L (ref 136–145)
T4 FREE SERPL-MCNC: 1.11 NG/DL (ref 0.76–1.46)
TRIGL SERPL-MCNC: 170 MG/DL
TSH SERPL DL<=0.05 MIU/L-ACNC: 1.72 UIU/ML (ref 0.36–3.74)
WBC # BLD AUTO: 8.75 THOUSAND/UL (ref 4.31–10.16)

## 2019-06-18 PROCEDURE — 85025 COMPLETE CBC W/AUTO DIFF WBC: CPT

## 2019-06-18 PROCEDURE — 83036 HEMOGLOBIN GLYCOSYLATED A1C: CPT

## 2019-06-18 PROCEDURE — 80061 LIPID PANEL: CPT

## 2019-06-18 PROCEDURE — 84443 ASSAY THYROID STIM HORMONE: CPT

## 2019-06-18 PROCEDURE — 86376 MICROSOMAL ANTIBODY EACH: CPT

## 2019-06-18 PROCEDURE — 80053 COMPREHEN METABOLIC PANEL: CPT

## 2019-06-18 PROCEDURE — 84439 ASSAY OF FREE THYROXINE: CPT

## 2019-06-18 PROCEDURE — 3008F BODY MASS INDEX DOCD: CPT | Performed by: FAMILY MEDICINE

## 2019-06-18 PROCEDURE — 99203 OFFICE O/P NEW LOW 30 MIN: CPT | Performed by: FAMILY MEDICINE

## 2019-06-18 PROCEDURE — 86800 THYROGLOBULIN ANTIBODY: CPT

## 2019-06-18 PROCEDURE — 36415 COLL VENOUS BLD VENIPUNCTURE: CPT

## 2019-06-19 LAB
THYROGLOB AB SERPL-ACNC: <1 IU/ML (ref 0–0.9)
THYROPEROXIDASE AB SERPL-ACNC: 12 IU/ML (ref 0–34)

## 2020-06-19 ENCOUNTER — TELEPHONE (OUTPATIENT)
Dept: OBGYN CLINIC | Facility: CLINIC | Age: 40
End: 2020-06-19

## 2020-06-24 ENCOUNTER — TRANSCRIBE ORDERS (OUTPATIENT)
Dept: ADMINISTRATIVE | Facility: HOSPITAL | Age: 40
End: 2020-06-24

## 2020-06-24 DIAGNOSIS — E03.9 HYPOTHYROIDISM, UNSPECIFIED TYPE: Primary | ICD-10-CM

## 2020-06-30 ENCOUNTER — HOSPITAL ENCOUNTER (OUTPATIENT)
Dept: RADIOLOGY | Facility: MEDICAL CENTER | Age: 40
Discharge: HOME | End: 2020-06-30
Payer: COMMERCIAL

## 2020-06-30 DIAGNOSIS — E03.9 HYPOTHYROIDISM, UNSPECIFIED TYPE: ICD-10-CM

## 2020-06-30 PROCEDURE — 76536 US EXAM OF HEAD AND NECK: CPT

## 2020-07-07 ENCOUNTER — OFFICE VISIT (OUTPATIENT)
Dept: FAMILY MEDICINE CLINIC | Facility: CLINIC | Age: 40
End: 2020-07-07
Payer: COMMERCIAL

## 2020-07-07 VITALS
WEIGHT: 203 LBS | BODY MASS INDEX: 37.36 KG/M2 | HEART RATE: 76 BPM | TEMPERATURE: 98.1 F | HEIGHT: 62 IN | SYSTOLIC BLOOD PRESSURE: 124 MMHG | DIASTOLIC BLOOD PRESSURE: 80 MMHG

## 2020-07-07 DIAGNOSIS — R20.2 PARESTHESIA OF BOTH HANDS: ICD-10-CM

## 2020-07-07 DIAGNOSIS — R14.0 ABDOMINAL BLOATING: ICD-10-CM

## 2020-07-07 DIAGNOSIS — Z12.39 BREAST CANCER SCREENING: Primary | ICD-10-CM

## 2020-07-07 DIAGNOSIS — R20.2 NUMBNESS AND TINGLING OF FOOT: ICD-10-CM

## 2020-07-07 DIAGNOSIS — D72.829 LEUKOCYTOSIS, UNSPECIFIED TYPE: ICD-10-CM

## 2020-07-07 DIAGNOSIS — O24.414 INSULIN CONTROLLED GESTATIONAL DIABETES MELLITUS (GDM) DURING PREGNANCY, ANTEPARTUM: ICD-10-CM

## 2020-07-07 DIAGNOSIS — G47.00 INSOMNIA, UNSPECIFIED TYPE: ICD-10-CM

## 2020-07-07 DIAGNOSIS — M25.50 ARTHRALGIA, UNSPECIFIED JOINT: ICD-10-CM

## 2020-07-07 DIAGNOSIS — R10.2 PELVIC PAIN IN FEMALE: ICD-10-CM

## 2020-07-07 DIAGNOSIS — O24.419 GESTATIONAL DIABETES MELLITUS (GDM), ANTEPARTUM, GESTATIONAL DIABETES METHOD OF CONTROL UNSPECIFIED: ICD-10-CM

## 2020-07-07 DIAGNOSIS — L40.9 PSORIASIS: ICD-10-CM

## 2020-07-07 DIAGNOSIS — R19.4 CHANGE IN BOWEL HABITS: ICD-10-CM

## 2020-07-07 DIAGNOSIS — N92.6 MENSTRUAL IRREGULARITY: ICD-10-CM

## 2020-07-07 DIAGNOSIS — H04.123 DRY EYES: ICD-10-CM

## 2020-07-07 DIAGNOSIS — E03.9 HYPOTHYROIDISM, UNSPECIFIED TYPE: ICD-10-CM

## 2020-07-07 DIAGNOSIS — R20.0 NUMBNESS AND TINGLING OF FOOT: ICD-10-CM

## 2020-07-07 PROBLEM — K44.9 HIATAL HERNIA: Status: ACTIVE | Noted: 2020-07-07

## 2020-07-07 PROCEDURE — 1036F TOBACCO NON-USER: CPT | Performed by: PHYSICIAN ASSISTANT

## 2020-07-07 PROCEDURE — 3008F BODY MASS INDEX DOCD: CPT | Performed by: PHYSICIAN ASSISTANT

## 2020-07-07 PROCEDURE — 99214 OFFICE O/P EST MOD 30 MIN: CPT | Performed by: PHYSICIAN ASSISTANT

## 2020-07-07 RX ORDER — DIPHENHYDRAMINE HCL 25 MG
25 TABLET ORAL
COMMUNITY

## 2020-07-07 RX ORDER — LEVOTHYROXINE SODIUM 50 UG/1
50 CAPSULE ORAL DAILY
COMMUNITY
Start: 2020-06-29

## 2020-07-07 RX ORDER — LORAZEPAM 0.5 MG/1
0.5 TABLET ORAL 2 TIMES DAILY PRN
COMMUNITY
Start: 2020-06-29

## 2020-07-07 RX ORDER — CALCIPOTRIENE 50 UG/G
CREAM TOPICAL 2 TIMES DAILY
Qty: 60 G | Refills: 11 | Status: SHIPPED | OUTPATIENT
Start: 2020-07-07

## 2020-07-07 RX ORDER — PHENOL 1.4 %
AEROSOL, SPRAY (ML) MUCOUS MEMBRANE
COMMUNITY

## 2020-07-07 NOTE — PATIENT INSTRUCTIONS
Problem List Items Addressed This Visit        Endocrine    Gestational diabetes mellitus (GDM)     Check hemoglobin A1c and CMP fasting  Lab Results   Component Value Date    HGBA1C 5 8 06/18/2019            Relevant Orders    Comprehensive metabolic panel    Hemoglobin A1C    Hypothyroid     Per patient although I cannot find results from [facility] her last TSH was last month and it was too and her old provider put her down from 100 mcg daily to 25 mcg daily  She and her pharmacist both thought this was drained so she has only been taking 50 mcg instead of 100 mcg  Recheck TSH along with thyroid antibodies and PTH  Relevant Medications    Levothyroxine Sodium 50 MCG CAPS    Other Relevant Orders    TSH, 3rd generation with Free T4 reflex    Insulin controlled gestational diabetes mellitus (GDM) during pregnancy, antepartum       Musculoskeletal and Integument    Psoriasis     Trial Dovonex cream and if no help than refer to derm  Relevant Medications    calcipotriene (DOVONEX) 0 005 % cream    Other Relevant Orders    Ambulatory referral to Rheumatology       Other    Leukocytosis     Per patient [facility] last CBC showed a WBC of 16 which was 9 the week prior  Repeat CBC  Relevant Orders    CBC and differential    Paresthesia of both hands    Numbness and tingling of foot    Arthralgia     Pt  does have psoriasis so she could have psoriatic arthritis  I will check for lyme and other but do think a referral to rheum would be appropriate  Relevant Orders    Ambulatory referral to Rheumatology    JEANNIE Screen w/ Reflex to Titer/Pattern    Lyme Antibody Profile with reflex to WB    RF Screen w/ Reflex to Titer    C-reactive protein    Change in bowel habits    Dry eyes     Check Sjogren antibodies  Patient has psoriasis and could have other concomitant autoimmune diseases  Relevant Orders    Sjogren's Antibodies    Insomnia     For now continue over-the-counter Benadryl and melatonin  Check labs to rule out other causes  Relevant Orders    Thyroid Antibodies Panel    PTH, intact    Menstrual irregularity     Menses very light and farther apart and has pelvic pain intermittently  Check ultrasound pelvis with transvaginal   Estradiol FSH LH all done roughly 2-4 weeks ago and within normal limits  Relevant Orders    US abdomen and pelvis with transvaginal    Abdominal bloating     Also change in bowel habits from diarrhea constipation bloating because of all these symptoms I will check ultrasound abdomen  Relevant Orders    US abdomen and pelvis with transvaginal    Pelvic pain in female     Patient has not seen gyn in 3 years    Check ultrasound pelvic and transvaginal          Relevant Orders    US abdomen and pelvis with transvaginal      Other Visit Diagnoses     Breast cancer screening    -  Primary    Relevant Orders    Mammo screening bilateral w 3d & cad

## 2020-07-07 NOTE — PROGRESS NOTES
Assessment and Plan:  Patient with a constellation of multiple symptoms  Check blood work refer to rheumatology mammogram ordered check ultrasound abdomen pelvis transvaginal   Consider derm referral   I would like to see patient is 2 weeks after everything is done to review  Problem List Items Addressed This Visit        Endocrine    Gestational diabetes mellitus (GDM)     Check hemoglobin A1c and CMP fasting  Lab Results   Component Value Date    HGBA1C 5 8 06/18/2019            Relevant Orders    Comprehensive metabolic panel    Hemoglobin A1C    Hypothyroid     Per patient although I cannot find results from [facility] her last TSH was last month and it was too and her old provider put her down from 100 mcg daily to 25 mcg daily  She and her pharmacist both thought this was drained so she has only been taking 50 mcg instead of 100 mcg  Recheck TSH along with thyroid antibodies and PTH  Relevant Medications    Levothyroxine Sodium 50 MCG CAPS    Other Relevant Orders    TSH, 3rd generation with Free T4 reflex    Insulin controlled gestational diabetes mellitus (GDM) during pregnancy, antepartum       Musculoskeletal and Integument    Psoriasis     Trial Dovonex cream and if no help than refer to derm  Relevant Medications    calcipotriene (DOVONEX) 0 005 % cream    Other Relevant Orders    Ambulatory referral to Rheumatology       Other    Leukocytosis     Per patient [facility] last CBC showed a WBC of 16 which was 9 the week prior  Repeat CBC  Relevant Orders    CBC and differential    Paresthesia of both hands    Numbness and tingling of foot    Arthralgia     Pt  does have psoriasis so she could have psoriatic arthritis  I will check for lyme and other but do think a referral to rheum would be appropriate            Relevant Orders    Ambulatory referral to Rheumatology    JEANNIE Screen w/ Reflex to Titer/Pattern    Lyme Antibody Profile with reflex to WB    RF Screen w/ Reflex to Titer C-reactive protein    Change in bowel habits    Dry eyes     Check Sjogren antibodies  Patient has psoriasis and could have other concomitant autoimmune diseases  Relevant Orders    Sjogren's Antibodies    Insomnia     For now continue over-the-counter Benadryl and melatonin  Check labs to rule out other causes  Relevant Orders    Thyroid Antibodies Panel    PTH, intact    Menstrual irregularity     Menses very light and farther apart and has pelvic pain intermittently  Check ultrasound pelvis with transvaginal   Estradiol FSH LH all done roughly 2-4 weeks ago and within normal limits  Relevant Orders    US abdomen and pelvis with transvaginal    Abdominal bloating     Also change in bowel habits from diarrhea constipation bloating because of all these symptoms I will check ultrasound abdomen  Relevant Orders    US abdomen and pelvis with transvaginal    Pelvic pain in female     Patient has not seen gyn in 3 years  Check ultrasound pelvic and transvaginal          Relevant Orders    US abdomen and pelvis with transvaginal      Other Visit Diagnoses     Breast cancer screening    -  Primary    Relevant Orders    Mammo screening bilateral w 3d & cad                 Diagnoses and all orders for this visit:    Breast cancer screening  -     Mammo screening bilateral w 3d & cad; Future    Gestational diabetes mellitus (GDM), antepartum, gestational diabetes method of control unspecified  -     Comprehensive metabolic panel  -     Hemoglobin A1C    Hypothyroidism, unspecified type  -     TSH, 3rd generation with Free T4 reflex    Insulin controlled gestational diabetes mellitus (GDM) during pregnancy, antepartum    Psoriasis  -     Ambulatory referral to Rheumatology;  Future  -     calcipotriene (DOVONEX) 0 005 % cream; Apply topically 2 (two) times a day    Leukocytosis, unspecified type  -     CBC and differential; Future    Numbness and tingling of foot    Paresthesia of both hands    Arthralgia, unspecified joint  -     Ambulatory referral to Rheumatology; Future  -     JEANNIE Screen w/ Reflex to Titer/Pattern  -     Lyme Antibody Profile with reflex to WB  -     RF Screen w/ Reflex to Titer  -     C-reactive protein; Future    Change in bowel habits    Insomnia, unspecified type  -     Thyroid Antibodies Panel  -     PTH, intact    Dry eyes  -     Sjogren's Antibodies; Future    Menstrual irregularity  -     US abdomen and pelvis with transvaginal; Future    Abdominal bloating  -     US abdomen and pelvis with transvaginal; Future    Pelvic pain in female  -     US abdomen and pelvis with transvaginal; Future    Other orders  -     Levothyroxine Sodium 50 MCG CAPS; Take 25 mcg by mouth daily   -     LORazepam (ATIVAN) 0 5 mg tablet; Take 0 5 mg by mouth 2 (two) times a day as needed  -     Melatonin 10 MG TABS; Take by mouth  -     diphenhydrAMINE (BENADRYL) 25 mg tablet; Take 25 mg by mouth daily at bedtime as needed for itching (patient takes 4 Benadryl every night)              Subjective:      Patient ID: Wilman Torres is a 38 y o  female  CC:    Chief Complaint   Patient presents with   2012 [facility] patient here to get established and to discuss not feeling "right" including severe insomnia, burning inside out  patient's Dr ordered Seroquel which didnt work, then Ambien which worked  patient now taking Benadryl 100mg and 10mg Melatonin (off Ambien now)  Patient also takes Lorazepam occasionally  Patient tried a weighted blanket, aromatherapy,etc      Skin Problem     patient c/o itchy body and scalp-unsure if its psoriasis  Patient also c/o lighter and irregular periods for about 6 months  patient had blood work done fairly recently  ak       HPI:    New patient here to be established and to discuss a plethora of problems  Patient states she wants 2nd opinion from her normal PCP because he does not seem take the time to listen to her    Patient states that she always has headaches she always has joint pain but most recently in the past month or 2 she has been thinking that she might be perimenopause all because her periods are getting very very light and very far part she is having hot flashes heat intolerance cannot sleep no matter what over-the-counter she takes  Had a lot of diarrhea which now is constipation lot of finger and toe tingling coldness and white hands, feet itching dry eyes uncontrolled psoriasis and her biggest problem which is insomnia  Her PCP gave her Seroquel tried her on various sleeping aids and she had horrible side effects from this and then was recently has been taking 100 mg of Benadryl with melatonin to sleep  She states that even with this her eyes will not close  She has a 1year-old daughter and has not been to the gyn since her delivery  She also has various discomfort and bloating and fullness in her abdomen and pains in her pelvis intermittently in her upper abdomen as well  She had an EGD in the past that showed that she had an ulcer and her soft guess and a small hiatal hernia  She is concerned about her thyroid antibodies and other autoimmune disorders and think she might actually have 2 things going on at once  Her PCP found that her TSH was a level of 2 Willian is in the middle of range and patient has been on 100 mcg of thyroid for many many years and even though this was found he decreased from 100 mcg daily to 25 mcg daily  She the that this was not appropriate and sewed in the pharmacist so she has only been decreasing to 50 mcg the past 2 weeks  She states that many of the woman her family have thyroid problems and that she knows that the psoriasis is autoimmune and could be related other autoimmune problems        The following portions of the patient's history were reviewed and updated as appropriate: allergies, current medications, past family history, past medical history, past social history, past surgical history and problem list       Review of Systems   Constitutional: Positive for diaphoresis and fatigue  HENT: Negative  Eyes: Negative  Respiratory: Negative  Cardiovascular: Negative  Gastrointestinal: Positive for abdominal distention, abdominal pain, constipation and diarrhea  Endocrine: Positive for heat intolerance  Genitourinary: Positive for pelvic pain  Musculoskeletal: Positive for arthralgias  Skin: Negative  iching   Allergic/Immunologic: Negative  Neurological: Positive for headaches  Hematological: Negative  Psychiatric/Behavioral: Positive for sleep disturbance  The patient is nervous/anxious  Data to review:       Objective:    Vitals:    07/07/20 1903   BP: 124/80   Pulse: 76   Temp: 98 1 °F (36 7 °C)   Weight: 92 1 kg (203 lb)   Height: 5' 2" (1 575 m)        Physical Exam   Constitutional: She is oriented to person, place, and time  She appears well-developed and well-nourished  No distress  HENT:   Head: Normocephalic and atraumatic  Eyes: Conjunctivae are normal  Right eye exhibits no discharge  Left eye exhibits no discharge  Neck: Neck supple  Carotid bruit is not present  Cardiovascular: Normal rate, regular rhythm and normal heart sounds  Exam reveals no gallop and no friction rub  No murmur heard  Pulmonary/Chest: Effort normal and breath sounds normal  No respiratory distress  She has no wheezes  She has no rales  Abdominal: Soft  Bowel sounds are normal  She exhibits distension  She exhibits no shifting dullness, no pulsatile liver, no fluid wave, no abdominal bruit, no ascites, no pulsatile midline mass and no mass  There is tenderness in the epigastric area, suprapubic area and left upper quadrant  There is no rigidity, no rebound, no guarding, no CVA tenderness and negative Gonzalez's sign  Neurological: She is alert and oriented to person, place, and time  Skin: Skin is warm and dry  She is not diaphoretic     Psychiatric: Judgment and thought content normal  Her mood appears anxious  Her speech is rapid and/or pressured  She is agitated  Cognition and memory are normal    Nursing note and vitals reviewed  BMI Counseling: Body mass index is 37 13 kg/m²  The BMI is above normal  Nutrition recommendations include decreasing portion sizes, encouraging healthy choices of fruits and vegetables, decreasing fast food intake, consuming healthier snacks, limiting drinks that contain sugar, moderation in carbohydrate intake, increasing intake of lean protein, reducing intake of saturated and trans fat and reducing intake of cholesterol  Exercise recommendations include exercising 3-5 times per week  No pharmacotherapy was ordered

## 2020-07-08 NOTE — ASSESSMENT & PLAN NOTE
Pt  does have psoriasis so she could have psoriatic arthritis  I will check for lyme and other but do think a referral to rheum would be appropriate

## 2020-07-08 NOTE — ASSESSMENT & PLAN NOTE
Check Sjogren antibodies  Patient has psoriasis and could have other concomitant autoimmune diseases

## 2020-07-08 NOTE — ASSESSMENT & PLAN NOTE
Menses very light and farther apart and has pelvic pain intermittently  Check ultrasound pelvis with transvaginal   Estradiol FSH LH all done roughly 2-4 weeks ago and within normal limits

## 2020-07-08 NOTE — ASSESSMENT & PLAN NOTE
Also change in bowel habits from diarrhea constipation bloating because of all these symptoms I will check ultrasound abdomen

## 2020-07-13 ENCOUNTER — OFFICE VISIT (OUTPATIENT)
Dept: URGENT CARE | Facility: MEDICAL CENTER | Age: 40
End: 2020-07-13
Payer: COMMERCIAL

## 2020-07-13 VITALS
TEMPERATURE: 98 F | HEART RATE: 75 BPM | DIASTOLIC BLOOD PRESSURE: 92 MMHG | RESPIRATION RATE: 18 BRPM | HEIGHT: 62 IN | OXYGEN SATURATION: 98 % | BODY MASS INDEX: 37.54 KG/M2 | SYSTOLIC BLOOD PRESSURE: 147 MMHG | WEIGHT: 204 LBS

## 2020-07-13 DIAGNOSIS — L03.031 CELLULITIS AND ABSCESS OF TOE OF RIGHT FOOT: Primary | ICD-10-CM

## 2020-07-13 DIAGNOSIS — L02.611 CELLULITIS AND ABSCESS OF TOE OF RIGHT FOOT: Primary | ICD-10-CM

## 2020-07-13 LAB — HBA1C MFR BLD HPLC: 5.9 %

## 2020-07-13 PROCEDURE — 99213 OFFICE O/P EST LOW 20 MIN: CPT | Performed by: PHYSICIAN ASSISTANT

## 2020-07-13 RX ORDER — SULFAMETHOXAZOLE AND TRIMETHOPRIM 800; 160 MG/1; MG/1
1 TABLET ORAL EVERY 12 HOURS SCHEDULED
Qty: 14 TABLET | Refills: 0 | Status: SHIPPED | OUTPATIENT
Start: 2020-07-13 | End: 2020-07-20

## 2020-07-14 NOTE — PATIENT INSTRUCTIONS
Cellulitis foot  Bactrim DS twice daily x 7 days  Follow up with PCP in 3-5 days  Proceed to  ER if symptoms worsen  Cellulitis   WHAT YOU NEED TO KNOW:   Cellulitis is a skin infection caused by bacteria  Cellulitis may go away on its own or you may need treatment  Your healthcare provider may draw a [place] around the outside edges of your cellulitis  If your cellulitis spreads, your healthcare provider will see it outside of the [place]  DISCHARGE INSTRUCTIONS:   Call 911 if:   · You have sudden trouble breathing or chest pain  Return to the emergency department if:   · Your wound gets larger and more painful  · You feel a crackling under your skin when you touch it  · You have purple dots or bumps on your skin, or you see bleeding under your skin  · You have new swelling and pain in your legs  · The red, warm, swollen area gets larger  · You see red streaks coming from the infected area  Contact your healthcare provider if:   · You have a fever  · Your fever or pain does not go away or gets worse  · The area does not get smaller after 2 days of antibiotics  · Your skin is flaking or peeling off  · You have questions or concerns about your condition or care  Medicines:   · Antibiotics  help treat the bacterial infection  · NSAIDs , such as ibuprofen, help decrease swelling, pain, and fever  NSAIDs can cause stomach bleeding or kidney problems in certain people  If you take blood thinner medicine, always ask if NSAIDs are safe for you  Always read the medicine label and follow directions  Do not give these medicines to children under 10months of age without direction from your child's healthcare provider  · Acetaminophen  decreases pain and fever  It is available without a doctor's order  Ask how much to take and how often to take it  Follow directions   Read the labels of all other medicines you are using to see if they also contain acetaminophen, or ask your doctor or pharmacist  Acetaminophen can cause liver damage if not taken correctly  Do not use more than 4 grams (4,000 milligrams) total of acetaminophen in one day  · Take your medicine as directed  Contact your healthcare provider if you think your medicine is not helping or if you have side effects  Tell him or her if you are allergic to any medicine  Keep a list of the medicines, vitamins, and herbs you take  Include the amounts, and when and why you take them  Bring the list or the pill bottles to follow-up visits  Carry your medicine list with you in case of an emergency  Self-care:   · Elevate the area above the level of your heart  as often as you can  This will help decrease swelling and pain  Prop the area on pillows or blankets to keep it elevated comfortably  · Clean the area daily until the wound scabs over  Gently wash the area with soap and water  Pat dry  Use dressings as directed  · Place cool or warm, wet cloths on the area as directed  Use clean cloths and clean water  Leave it on the area until the cloth is room temperature  Pat the area dry with a clean, dry cloth  The cloths may help decrease pain  Prevent cellulitis:   · Do not scratch bug bites or areas of injury  You increase your risk for cellulitis by scratching these areas  · Do not share personal items, such as towels, clothing, and razors  · Clean exercise equipment  with germ-killing  before and after you use it  · Wash your hands often  Use soap and water  Wash your hands after you use the bathroom, change a child's diapers, or sneeze  Wash your hands before you prepare or eat food  Use lotion to prevent dry, cracked skin  · Wear pressure stockings as directed  You may be told to wear the stockings if you have peripheral edema  The stockings improve blood flow and decrease swelling  · Treat athlete's foot  This can help prevent the spread of a bacterial skin infection    Follow up with your healthcare provider within 3 days, or as directed: Your healthcare provider will check if your cellulitis is getting better  You may need different medicine  Write down your questions so you remember to ask them during your visits  © 2017 [facility] Information is for End User's use only and may not be sold, redistributed or otherwise used for commercial purposes  All illustrations and images included in CareNotes® are the copyrighted property of A D [employer]  or Nadir Bailey  The above information is an  only  It is not intended as medical advice for individual conditions or treatments  Talk to your doctor, nurse or pharmacist before following any medical regimen to see if it is safe and effective for you

## 2020-07-14 NOTE — PROGRESS NOTES
[facility]        NAME: Bryce Cordon is a 38 y o  female  : 1980    MRN: [id number]  DATE: 2020  TIME: 10:05 PM    Assessment and Plan   Cellulitis and abscess of toe of right foot [L03 031, L02 611]  1  Cellulitis and abscess of toe of right foot  sulfamethoxazole-trimethoprim (BACTRIM DS) 800-160 mg per tablet         Patient Instructions     Cellulitis foot  Bactrim DS twice daily x 7 days  Follow up with PCP in 3-5 days  Proceed to  ER if symptoms worsen  Chief Complaint     Chief Complaint   Patient presents with    Foot Pain     Right foot pain x 2 days  History of Present Illness       39 y/o female presents c/o pain to right foot  Patient states she was walking on grass bare foot and saw a puncture wound 2 days ago  And today saw pus coming out and increased pain  Denies fever, chills  States tetanus is up to date      Review of Systems   Review of Systems   Constitutional: Negative  HENT: Negative  Eyes: Negative  Respiratory: Negative  Negative for cough, chest tightness, shortness of breath, wheezing and stridor  Cardiovascular: Negative  Negative for chest pain, palpitations and leg swelling  Skin: Positive for wound           Current Medications       Current Outpatient Medications:     diphenhydrAMINE (BENADRYL) 25 mg tablet, Take 25 mg by mouth daily at bedtime as needed for itching (patient takes 4 Benadryl every night), Disp: , Rfl:     Levothyroxine Sodium 50 MCG CAPS, Take 25 mcg by mouth daily , Disp: , Rfl:     LORazepam (ATIVAN) 0 5 mg tablet, Take 0 5 mg by mouth 2 (two) times a day as needed, Disp: , Rfl:     Melatonin 10 MG TABS, Take by mouth, Disp: , Rfl:     calcipotriene (DOVONEX) 0 005 % cream, Apply topically 2 (two) times a day (Patient not taking: Reported on 2020), Disp: 60 g, Rfl: 11    levothyroxine 100 mcg tablet, Take 100 mcg by mouth daily, Disp: , Rfl:     sulfamethoxazole-trimethoprim (BACTRIM DS) 800-160 mg per tablet, Take 1 tablet by mouth every 12 (twelve) hours for 7 days, Disp: 14 tablet, Rfl: 0    Current Allergies     Allergies as of 2020 - Reviewed 2020   Allergen Reaction Noted    Penicillins Hives 2013    Pollen extract  2017            The following portions of the patient's history were reviewed and updated as appropriate: allergies, current medications, past family history, past medical history, past social history, past surgical history and problem list      Past Medical History:   Diagnosis Date    Anxiety disorder     Diabetes mellitus ([address] )     gestational insulin    Disease of thyroid gland     hypo    Elderly multigravida, currently pregnant     Resolved 2017     Fatty liver     Hereditary disease in family possibly affecting fetus     Resolved 2017     Hypertension     occ    Insulin controlled gestational diabetes mellitus (GDM) in third trimester     Resolved 2017     Palpitations     SVT (supraventricular tachycardia) ([address] )     Varicella     childhood       Past Surgical History:   Procedure Laterality Date    BREAST BIOPSY      CARDIAC ELECTROPHYSIOLOGY STUDY AND ABLATION      GYNECOLOGIC CRYOSURGERY      INDUCED          Family History   Problem Relation Age of Onset    Asthma Mother     Hypertension Mother     Hypothyroidism Mother     Hypothyroidism Sister     Hypothyroidism Maternal Aunt     Hypothyroidism Maternal Grandmother     Diabetes Maternal Grandfather     Mental illness Sister     Other Family         Tachycardia          Medications have been verified  Objective   /92   Pulse 75   Temp 98 °F (36 7 °C) (Temporal)   Resp 18   Ht 5' 2" (1 575 m)   Wt 92 5 kg (204 lb)   LMP 2020 (Exact Date)   SpO2 98%   BMI 37 31 kg/m²        Physical Exam     Physical Exam   Constitutional: She appears well-developed and well-nourished  Cardiovascular: Normal rate and regular rhythm  Pulmonary/Chest: Effort normal and breath sounds normal    Musculoskeletal:        Right foot: There is tenderness and swelling  There is normal range of motion, no bony tenderness, normal capillary refill, no crepitus and no deformity          Feet:          Patient deferred xrays at this time

## 2020-07-31 ENCOUNTER — TRANSCRIBE ORDERS (OUTPATIENT)
Dept: ADMINISTRATIVE | Facility: HOSPITAL | Age: 40
End: 2020-07-31

## 2020-07-31 DIAGNOSIS — N63.10 LUMP OF RIGHT BREAST: Primary | ICD-10-CM

## 2020-08-21 ENCOUNTER — HOSPITAL ENCOUNTER (OUTPATIENT)
Dept: ULTRASOUND IMAGING | Facility: CLINIC | Age: 40
Discharge: HOME | End: 2020-08-21
Payer: COMMERCIAL

## 2020-08-21 ENCOUNTER — HOSPITAL ENCOUNTER (OUTPATIENT)
Dept: MAMMOGRAPHY | Facility: CLINIC | Age: 40
Discharge: HOME | End: 2020-08-21
Payer: COMMERCIAL

## 2020-08-21 VITALS — HEIGHT: 62 IN | WEIGHT: 195 LBS | BODY MASS INDEX: 35.88 KG/M2

## 2020-08-21 DIAGNOSIS — R92.8 ABNORMAL MAMMOGRAM: ICD-10-CM

## 2020-08-21 DIAGNOSIS — N63.10 LUMP OF RIGHT BREAST: ICD-10-CM

## 2020-08-21 PROCEDURE — 76642 ULTRASOUND BREAST LIMITED: CPT

## 2020-08-21 PROCEDURE — 77066 DX MAMMO INCL CAD BI: CPT

## 2020-08-21 PROCEDURE — G0279 TOMOSYNTHESIS, MAMMO: HCPCS

## 2021-03-23 ENCOUNTER — OFFICE VISIT (OUTPATIENT)
Dept: OBGYN CLINIC | Facility: MEDICAL CENTER | Age: 41
End: 2021-03-23
Payer: COMMERCIAL

## 2021-03-23 VITALS
BODY MASS INDEX: 35.88 KG/M2 | HEART RATE: 84 BPM | WEIGHT: 195 LBS | DIASTOLIC BLOOD PRESSURE: 90 MMHG | SYSTOLIC BLOOD PRESSURE: 136 MMHG | HEIGHT: 62 IN

## 2021-03-23 DIAGNOSIS — M25.472 SWELLING OF ANKLE JOINT, LEFT: ICD-10-CM

## 2021-03-23 DIAGNOSIS — M95.8 OSTEOCHONDRAL DEFECT OF ANKLE: ICD-10-CM

## 2021-03-23 DIAGNOSIS — M25.572 CHRONIC PAIN OF LEFT ANKLE: Primary | ICD-10-CM

## 2021-03-23 DIAGNOSIS — G89.29 CHRONIC PAIN OF LEFT ANKLE: Primary | ICD-10-CM

## 2021-03-23 PROCEDURE — 99204 OFFICE O/P NEW MOD 45 MIN: CPT | Performed by: EMERGENCY MEDICINE

## 2021-03-23 PROCEDURE — 1036F TOBACCO NON-USER: CPT | Performed by: EMERGENCY MEDICINE

## 2021-03-23 PROCEDURE — 3008F BODY MASS INDEX DOCD: CPT | Performed by: EMERGENCY MEDICINE

## 2021-03-23 NOTE — PROGRESS NOTES
Assessment/Plan:    Diagnoses and all orders for this visit:    Chronic pain of left ankle  -     MRI ankle/heel left  wo contrast; Future  -     Cam Boot  -     Ambulatory referral to Physical Therapy; Future    Osteochondral defect of ankle  -     MRI ankle/heel left  wo contrast; Future  -     Cam Boot  -     Ambulatory referral to Physical Therapy; Future    Swelling of ankle joint, left  -     MRI ankle/heel left  wo contrast; Future  -     Cam Boot  -     Ambulatory referral to Physical Therapy; Future    There appears to be a possible medial talar dome osteochondral lesion seen on AP view, and with her hx of posterior malleolar fracture I would recommend MRI ankle  We have provided a tall CAM boot as well as referral to PT  Continue Motrin, ice, rest   Work note provided  Reviewed Xrays from 2010 and 2/27/21    Return for Follow Up After Imaging Study  Chief Complaint:     Chief Complaint   Patient presents with    Left Ankle - Pain       Subjective:   Patient ID: Marielos Monge is a 38 y o  female  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 and swelling denies injury, she has been prescribed Mobic and has also taken motrin with no significant relief  She has tried ankle sleeves/braces  States is worsening  Xray of the ankle was obtained at an outside facility and reviewed today  She notes an ankle fracture 10 years ago, I have reviewed those Xrays which showed a posterior malleolar fracture      Review of Systems   Constitutional: Negative for chills and fever  HENT: Negative for drooling and sore throat  Eyes: Negative for pain and redness  Respiratory: Negative for shortness of breath and stridor  Cardiovascular: Negative for chest pain and palpitations  Gastrointestinal: Negative for abdominal pain  Genitourinary: Negative for difficulty urinating  Musculoskeletal: Positive for arthralgias, gait problem and joint swelling  Skin: Negative for rash  Neurological: Negative for weakness  Psychiatric/Behavioral: Negative for self-injury and suicidal ideas  The following portions of the patient's chart were reviewed and updated as appropriate:    Allergy:    Allergies   Allergen Reactions    Penicillins Hives    Pollen Extract          Past Medical History:   Diagnosis Date    Anxiety disorder     Diabetes mellitus ([facility] [address] )     gestational insulin    Disease of thyroid gland     hypo    Elderly multigravida, currently pregnant     Resolved 2017     Fatty liver     Hereditary disease in family possibly affecting fetus     Resolved 2017     Hypertension     occ    Insulin controlled gestational diabetes mellitus (GDM) in third trimester     Resolved 2017     Palpitations     SVT (supraventricular tachycardia) ([facility] [address] )     Varicella     childhood       Past Surgical History:   Procedure Laterality Date    BREAST CYST EXCISION Left     age 24- benign    CARDIAC ELECTROPHYSIOLOGY STUDY AND ABLATION      GYNECOLOGIC CRYOSURGERY      INDUCED          Social History     Socioeconomic History    Marital status: /Civil Union     Spouse name: Not on file    Number of children: Not on file    Years of education: Not on file    Highest education level: Not on file   Occupational History    Not on file   Social Needs    Financial resource strain: Not on file    Food insecurity     Worry: Not on file     Inability: Not on file   [employer] needs     Medical: Not on file     Non-medical: Not on file   Tobacco Use    Smoking status: Former Smoker    Smokeless tobacco: Never Used   Substance and Sexual Activity    Alcohol use: Yes     Frequency: Monthly or less     Comment: social    Drug use: No    Sexual activity: Yes     Partners: Male   Lifestyle    Physical activity     Days per week: Not on file     Minutes per session: Not on file    Stress: Not on file   Relationships    Social connections     Talks on phone: Not on file     Gets together: Not on file     Attends Sabianist service: Not on file     Active member of club or organization: Not on file     Attends meetings of clubs or organizations: Not on file     Relationship status: Not on file    Intimate partner violence     Fear of current or ex partner: Not on file     Emotionally abused: Not on file     Physically abused: Not on file     Forced sexual activity: Not on file   Other Topics Concern    Not on file   Social History Narrative    Being a social drinker - As per [employer]    Denied: History of domestic violence        Family History   Problem Relation Age of Onset    Asthma Mother     Hypertension Mother     Hypothyroidism Mother     Hypothyroidism Sister     Hypothyroidism Maternal Aunt     Hypothyroidism Maternal Grandmother     Diabetes Maternal Grandfather     Mental illness Sister     Other Family         Tachycardia     No Known Problems Father     No Known Problems Daughter     No Known Problems Maternal Aunt     No Known Problems Maternal Aunt     Breast cancer Neg Hx        Medications:    Current Outpatient Medications:     diphenhydrAMINE (BENADRYL) 25 mg tablet, Take 25 mg by mouth daily at bedtime as needed for itching (patient takes 4 Benadryl every night), Disp: , Rfl:     levothyroxine 100 mcg tablet, Take 100 mcg by mouth daily, Disp: , Rfl:     Levothyroxine Sodium 50 MCG CAPS, Take 25 mcg by mouth daily , Disp: , Rfl:     LORazepam (ATIVAN) 0 5 mg tablet, Take 0 5 mg by mouth 2 (two) times a day as needed, Disp: , Rfl:     Melatonin 10 MG TABS, Take by mouth, Disp: , Rfl:     calcipotriene (DOVONEX) 0 005 % cream, Apply topically 2 (two) times a day (Patient not taking: Reported on 7/13/2020), Disp: 60 g, Rfl: 11    Patient Active Problem List   Diagnosis    Gestational diabetes mellitus (GDM)    Hypothyroid    Esophagitis, reflux    Leukocytosis    Supraventricular tachycardia ([facility] [address] [facility]    Anxiety    Chronic bilateral low back pain without sciatica    Pain of both hip joints    Dry skin    Paresthesia of both hands    Numbness and tingling of foot    Psoriasis    Insulin controlled gestational diabetes mellitus (GDM) during pregnancy, antepartum    Hiatal hernia    Arthralgia    Change in bowel habits    Dry eyes    Insomnia    Menstrual irregularity    Abdominal bloating    Pelvic pain in female       Objective:  /90 (BP Location: Right arm, Patient Position: Sitting, Cuff Size: Standard)   Pulse 84   Ht 5' 2" (1 575 m)   Wt 88 5 kg (195 lb)   BMI 35 67 kg/m²     Right Ankle Exam     Range of Motion   The patient has normal right ankle ROM  Left Ankle Exam     Tenderness   The patient is experiencing tenderness in the deltoid  Swelling: mild    Range of Motion   The patient has normal left ankle ROM  Muscle Strength   The patient has normal left ankle strength  Tests   Anterior drawer: negative  Varus tilt: negative    Other   Erythema: absent  Sensation: normal  Pulse: present    Comments:  Pain reproduced with resisted plantarflexion and great toe flexion  Neg Valgus tilt          Strength/Myotome Testing     Left Ankle/Foot   Normal strength      Physical Exam  Vitals signs reviewed  Constitutional:       Appearance: She is well-developed  HENT:      Head: Normocephalic and atraumatic  Eyes:      Conjunctiva/sclera: Conjunctivae normal    Neck:      Musculoskeletal: Normal range of motion and neck supple  Cardiovascular:      Rate and Rhythm: Normal rate  Pulmonary:      Effort: Pulmonary effort is normal  No respiratory distress  Skin:     General: Skin is warm and dry  Neurological:      Mental Status: She is alert and oriented to person, place, and time  Psychiatric:         Behavior: Behavior normal            Neurologic Exam     Mental Status   Oriented to person, place, and time         Procedures    I have personally reviewed pertinent films in PACS  and I have personally reviewed the written report of the pertinent studies       2/27/21 Xrays Ankle

## 2021-03-23 NOTE — LETTER
March 23, 2021     Patient: Renetta Weber   YOB: 1980   Date of Visit: 3/23/2021       To Whom it May Concern:    Jae Aleman is under my professional care  She was seen in my office on 3/23/2021  Please allow walking boot at work  If you have any questions or concerns, please don't hesitate to call           Sincerely,          Madeleine Linares MD        CC: No Recipients

## 2021-04-19 ENCOUNTER — TELEPHONE (OUTPATIENT)
Dept: OBGYN CLINIC | Facility: HOSPITAL | Age: 41
End: 2021-04-19

## 2021-04-19 NOTE — TELEPHONE ENCOUNTER
DR Raffaele Willams  RE: MRI L ankle    Patient states [facility] is needed for L ankle MRI + Order needs to be faxed to [facility]:    Insurance: [facility]    Appt: [phone]  Facility: [facility]  Address: [address] [place], [place] [address]  Phone #: [phone]  FAX# [phone] ATT: [facility]

## 2021-04-19 NOTE — TELEPHONE ENCOUNTER
Richard has been started for patients MRI, it is currently pending  I faxed over the MRI script to the number provided

## 2021-04-23 ENCOUNTER — TELEPHONE (OUTPATIENT)
Dept: OBGYN CLINIC | Facility: HOSPITAL | Age: 41
End: 2021-04-23

## 2021-04-23 NOTE — TELEPHONE ENCOUNTER
Patient sees Dr Phill Gaspar    Patient is calling with regard to her MRI results  Patient would like to know if there is something urgent in the results that she would need to be seen sooner or if she can stick with her 5/4 appointment      Call back # [phone]

## 2021-04-27 NOTE — TELEPHONE ENCOUNTER
Patient given above information and verbalized understanding  She is having problems with the boot at work, she is wondering if she could try a shorter boot  The boot she has is giving her back problems

## 2021-04-27 NOTE — TELEPHONE ENCOUNTER
Patient called back returning phone call to Dr Whitley Alcantar  I offered to move up the appointment to [facility] but she only wants [address] [place] [address] location  She would like a call from the nurse to discuss MRI results

## 2021-04-27 NOTE — TELEPHONE ENCOUNTER
Please call patient to let her know that MRI shows edema or swelling of the bone on the inside of the ankle along with cyst formation  She should continue the walking boot when ambulating if she is still having pain    Will discuss more at next appt  Thanks

## 2021-04-28 NOTE — TELEPHONE ENCOUNTER
Called the patient she confirmed her understanding, and will be in for her appointment on Tuesday 5/4

## 2021-05-04 ENCOUNTER — OFFICE VISIT (OUTPATIENT)
Dept: OBGYN CLINIC | Facility: MEDICAL CENTER | Age: 41
End: 2021-05-04
Payer: COMMERCIAL

## 2021-05-04 VITALS
DIASTOLIC BLOOD PRESSURE: 79 MMHG | HEART RATE: 87 BPM | HEIGHT: 62 IN | BODY MASS INDEX: 35.88 KG/M2 | SYSTOLIC BLOOD PRESSURE: 112 MMHG | WEIGHT: 195 LBS

## 2021-05-04 DIAGNOSIS — M95.8 OSTEOCHONDRAL DEFECT OF ANKLE: ICD-10-CM

## 2021-05-04 DIAGNOSIS — G89.29 CHRONIC PAIN OF LEFT ANKLE: Primary | ICD-10-CM

## 2021-05-04 DIAGNOSIS — M25.472 SWELLING OF ANKLE JOINT, LEFT: ICD-10-CM

## 2021-05-04 DIAGNOSIS — M25.572 CHRONIC PAIN OF LEFT ANKLE: Primary | ICD-10-CM

## 2021-05-04 PROCEDURE — 3008F BODY MASS INDEX DOCD: CPT | Performed by: EMERGENCY MEDICINE

## 2021-05-04 PROCEDURE — 1036F TOBACCO NON-USER: CPT | Performed by: EMERGENCY MEDICINE

## 2021-05-04 PROCEDURE — 99213 OFFICE O/P EST LOW 20 MIN: CPT | Performed by: EMERGENCY MEDICINE

## 2021-05-04 NOTE — LETTER
May 4, 2021     Patient: Kristi Smith   YOB: 1980   Date of Visit: 5/4/2021       To Whom it May Concern:    Zuri Burrell is under my professional care  She was seen in my office on 5/4/2021  Please allow 1 week of work if possible due to medical issue  If you have any questions or concerns, please don't hesitate to call           Sincerely,          Yolette Joshua MD        CC: No Recipients

## 2021-05-04 NOTE — PROGRESS NOTES
Assessment/Plan:    Diagnoses and all orders for this visit:    Chronic pain of left ankle  -     Ambulatory referral to Orthopedic Surgery; Future    Osteochondral defect of ankle  -     Ambulatory referral to Orthopedic Surgery; Future    Swelling of ankle joint, left  -     Ambulatory referral to Orthopedic Surgery; Future    Bone marrow edema of medial malleolus with small intraosseous cyst likely causing  Given her hx of posterior malleolar fracture and current symptoms appreciate input from foot and ankle  I did recommend to the patient that most likely rest activity modification and Cam boot would help resolve this for her however patient is unable to take significant time off of work and she did have difficulty will wearing the Cam boot that we provided for her  Recommend Spenco arch supports (green and black) on [employer]  Wean into wearing them, as your feet will need to get use to them  Return if symptoms worsen or fail to improve  Chief Complaint:     Chief Complaint   Patient presents with    Left Ankle - Follow-up       Subjective:   Patient ID: Nicholas Torres is a 38 y o  female  Patient returns  To review MRI of the left ankle for medial pain since December  She originally denied any injury at the time but she does have a history of an ankle fracture 10 years ago which showed a posterior malleolar fracture on x-rays from that time  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  She has used meloxicam as well as Motrin and ankle braces with no significant improvement  Initial note:  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 and swelling denies injury, she has been prescribed Mobic and has also taken motrin with no significant relief  She has tried ankle sleeves/braces  States is worsening  Xray of the ankle was obtained at an outside facility and reviewed today    She notes an ankle fracture 10 years ago, I have reviewed those Xrays which showed a posterior malleolar fracture    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       Review of Systems    The following portions of the patient's chart were reviewed and updated as appropriate:      Allergie  Allergies   Allergen Reactions    Penicillins Hives    Pollen Extract    s   Allergen Reactions    Penicillins Hives    Pollen Extract       Diagnosis Date    Anxiety disorder     Diabetes mellitus ([facility] [address] [facility]     gestational insulin    Disease of thyroid gland     hypo    Elderly multigravida, currently pregnant     Resolved 2017     Fatty liver     Hereditary disease in family possibly affecting fetus     Resolved 2017     Hypertension     occ    Insulin controlled gestational diabetes mellitus (GDM) in third trimester     Resolved 2017     Palpitations     SVT (supraventricular tachycardia) ([facility] [address] [facility]     Varicella     childhood       Past Surgical History:   Procedure Laterality Date    BREAST CYST EXCISION Left     age 24- benign    CARDIAC ELECTROPHYSIOLOGY STUDY AND ABLATION      GYNECOLOGIC CRYOSURGERY      INDUCED          Social History     Socioeconomic History    Marital status: /Civil Union     Spouse name: Not on file    Number of children: Not on file    Years of education: Not on file    Highest education level: Not on file   Occupational History    Not on file   Social Needs    Financial resource strain: Not on file    Food insecurity     Worry: Not on file     Inability: Not on file   [employer] needs     Medical: Not on file     Non-medical: Not on file   Tobacco Use    Smoking status: Former Smoker    Smokeless tobacco: Never Used   Substance and Sexual Activity    Alcohol use: Yes     Frequency: Monthly or less     Comment: social    Drug use: No    Sexual activity: Yes     Partners: Male   Lifestyle    Physical activity     Days per week: Not on file     Minutes per session: Not on file    Stress: Not on file Relationships    Social connections     Talks on phone: Not on file     Gets together: Not on file     Attends [employer] service: Not on file     Active member of club or organization: Not on file     Attends meetings of clubs or organizations: Not on file     Relationship status: Not on file    Intimate partner violence     Fear of current or ex partner: Not on file     Emotionally abused: Not on file     Physically abused: Not on file     Forced sexual activity: Not on file   Other Topics Concern    Not on file   Social History Narrative    Being a social drinker - As per [employer]    Denied: History of domestic violence        Family History   Problem Relation Age of Onset    Asthma Mother     Hypertension Mother     Hypothyroidism Mother     Hypothyroidism Sister     Hypothyroidism Maternal Aunt     Hypothyroidism Maternal Grandmother     Diabetes Maternal Grandfather     Mental illness Sister     Other Family         Tachycardia     No Known Problems Father     No Known Problems Daughter     No Known Problems Maternal Aunt     No Known Problems Maternal Aunt     Breast cancer Neg Hx        Medications:    Current Outpatient Medications:     diphenhydrAMINE (BENADRYL) 25 mg tablet, Take 25 mg by mouth daily at bedtime as needed for itching (patient takes 4 Benadryl every night), Disp: , Rfl:     levothyroxine 100 mcg tablet, Take 100 mcg by mouth daily, Disp: , Rfl:     Levothyroxine Sodium 50 MCG CAPS, Take 25 mcg by mouth daily , Disp: , Rfl:     LORazepam (ATIVAN) 0 5 mg tablet, Take 0 5 mg by mouth 2 (two) times a day as needed, Disp: , Rfl:     Melatonin 10 MG TABS, Take by mouth, Disp: , Rfl:     calcipotriene (DOVONEX) 0 005 % cream, Apply topically 2 (two) times a day (Patient not taking: Reported on 7/13/2020), Disp: 60 g, Rfl: 11    Patient Active Problem List   Diagnosis    Gestational diabetes mellitus (GDM)    Hypothyroid    Esophagitis, reflux    Leukocytosis    Supraventricular tachycardia (HCC)    Anxiety    Chronic bilateral low back pain without sciatica    Pain of both hip joints    Dry skin    Paresthesia of both hands    Numbness and tingling of foot    Psoriasis    Insulin controlled gestational diabetes mellitus (GDM) during pregnancy, antepartum    Hiatal hernia    Arthralgia    Change in bowel habits    Dry eyes    Insomnia    Menstrual irregularity    Abdominal bloating    Pelvic pain in female       Objective:  /79 (BP Location: Right arm, Patient Position: Sitting, Cuff Size: Standard)   Pulse 87   Ht 5' 2" (1 575 m)   Wt 88 5 kg (195 lb)   BMI 35 67 kg/m²     Ortho Exam    Physical Exam  Vitals signs reviewed  Constitutional:       Appearance: She is well-developed  HENT:      Head: Normocephalic and atraumatic  Eyes:      Conjunctiva/sclera: Conjunctivae normal    Neck:      Musculoskeletal: Normal range of motion and neck supple  Cardiovascular:      Rate and Rhythm: Normal rate  Pulmonary:      Effort: Pulmonary effort is normal  No respiratory distress  Skin:     General: Skin is warm and dry  Neurological:      Mental Status: She is alert and oriented to person, place, and time  Psychiatric:         Behavior: Behavior normal            Neurologic Exam     Mental Status   Oriented to person, place, and time  Procedures    I have personally reviewed the written report of the pertinent studies     MRI Ankle results reviewed official results scanned into EHR

## 2021-05-04 NOTE — PATIENT INSTRUCTIONS
Recommend Spenco arch supports (green and black) on [employer]  Wean into wearing them, as your feet will need to get use to them

## 2021-11-29 ENCOUNTER — ANESTHESIA EVENT (OUTPATIENT)
Dept: PERIOP | Facility: HOSPITAL | Age: 41
End: 2021-11-29
Payer: COMMERCIAL

## 2021-11-30 RX ORDER — FAMOTIDINE 20 MG/1
20 TABLET, FILM COATED ORAL DAILY PRN
COMMUNITY

## 2021-11-30 RX ORDER — CLONIDINE HYDROCHLORIDE 0.1 MG/1
TABLET ORAL
COMMUNITY
Start: 2021-11-13

## 2021-11-30 RX ORDER — SERTRALINE HYDROCHLORIDE 100 MG/1
100 TABLET, FILM COATED ORAL DAILY
COMMUNITY
Start: 2021-11-27

## 2021-12-06 ENCOUNTER — HOSPITAL ENCOUNTER (OUTPATIENT)
Facility: HOSPITAL | Age: 41
Setting detail: OUTPATIENT SURGERY
Discharge: HOME | End: 2021-12-06
Attending: PODIATRIST | Admitting: PODIATRIST
Payer: COMMERCIAL

## 2021-12-06 ENCOUNTER — ANESTHESIA (OUTPATIENT)
Dept: PERIOP | Facility: HOSPITAL | Age: 41
End: 2021-12-06
Payer: COMMERCIAL

## 2021-12-06 ENCOUNTER — APPOINTMENT (OUTPATIENT)
Dept: RADIOLOGY | Facility: HOSPITAL | Age: 41
End: 2021-12-06
Payer: COMMERCIAL

## 2021-12-06 VITALS
SYSTOLIC BLOOD PRESSURE: 106 MMHG | WEIGHT: 220 LBS | BODY MASS INDEX: 40.48 KG/M2 | HEART RATE: 76 BPM | TEMPERATURE: 98.8 F | HEIGHT: 62 IN | DIASTOLIC BLOOD PRESSURE: 62 MMHG | OXYGEN SATURATION: 97 % | RESPIRATION RATE: 18 BRPM

## 2021-12-06 DIAGNOSIS — Z98.890 POST-OPERATIVE STATE: Primary | ICD-10-CM

## 2021-12-06 LAB
ATRIAL RATE: 65 BPM
EXT PREGNANCY TEST URINE: NEGATIVE
EXT. CONTROL: NORMAL
P AXIS: 3 DEGREES
PR INTERVAL: 118 MS
QRS AXIS: 3 DEGREES
QRSD INTERVAL: 74 MS
QT INTERVAL: 406 MS
QTC INTERVAL: 422 MS
T WAVE AXIS: 6 DEGREES
VENTRICULAR RATE: 65 BPM

## 2021-12-06 PROCEDURE — 93005 ELECTROCARDIOGRAM TRACING: CPT

## 2021-12-06 PROCEDURE — 81025 URINE PREGNANCY TEST: CPT | Performed by: PODIATRIST

## 2021-12-06 PROCEDURE — 73610 X-RAY EXAM OF ANKLE: CPT

## 2021-12-06 PROCEDURE — C9290 INJ, BUPIVACAINE LIPOSOME: HCPCS

## 2021-12-06 PROCEDURE — C1713 ANCHOR/SCREW BN/BN,TIS/BN: HCPCS | Performed by: PODIATRIST

## 2021-12-06 PROCEDURE — 93010 ELECTROCARDIOGRAM REPORT: CPT | Performed by: INTERNAL MEDICINE

## 2021-12-06 PROCEDURE — 73600 X-RAY EXAM OF ANKLE: CPT

## 2021-12-06 DEVICE — 5CC HYDROSET INJECTABLE CEMENT
Type: IMPLANTABLE DEVICE | Site: TIBIA | Status: FUNCTIONAL
Brand: HYDROSET

## 2021-12-06 RX ORDER — ONDANSETRON 2 MG/ML
INJECTION INTRAMUSCULAR; INTRAVENOUS AS NEEDED
Status: DISCONTINUED | OUTPATIENT
Start: 2021-12-06 | End: 2021-12-06

## 2021-12-06 RX ORDER — FENTANYL CITRATE/PF 50 MCG/ML
50 SYRINGE (ML) INJECTION
Status: DISCONTINUED | OUTPATIENT
Start: 2021-12-06 | End: 2021-12-06 | Stop reason: HOSPADM

## 2021-12-06 RX ORDER — FENTANYL CITRATE 50 UG/ML
INJECTION, SOLUTION INTRAMUSCULAR; INTRAVENOUS AS NEEDED
Status: DISCONTINUED | OUTPATIENT
Start: 2021-12-06 | End: 2021-12-06

## 2021-12-06 RX ORDER — ONDANSETRON 2 MG/ML
4 INJECTION INTRAMUSCULAR; INTRAVENOUS ONCE AS NEEDED
Status: DISCONTINUED | OUTPATIENT
Start: 2021-12-06 | End: 2021-12-06 | Stop reason: HOSPADM

## 2021-12-06 RX ORDER — OXYCODONE HYDROCHLORIDE 5 MG/1
5 TABLET ORAL EVERY 4 HOURS PRN
Status: DISCONTINUED | OUTPATIENT
Start: 2021-12-06 | End: 2021-12-06 | Stop reason: HOSPADM

## 2021-12-06 RX ORDER — DEXAMETHASONE SODIUM PHOSPHATE 10 MG/ML
INJECTION, SOLUTION INTRAMUSCULAR; INTRAVENOUS AS NEEDED
Status: DISCONTINUED | OUTPATIENT
Start: 2021-12-06 | End: 2021-12-06

## 2021-12-06 RX ORDER — PROMETHAZINE HYDROCHLORIDE 25 MG/ML
12.5 INJECTION, SOLUTION INTRAMUSCULAR; INTRAVENOUS
Status: DISCONTINUED | OUTPATIENT
Start: 2021-12-06 | End: 2021-12-06 | Stop reason: HOSPADM

## 2021-12-06 RX ORDER — SODIUM CHLORIDE, SODIUM LACTATE, POTASSIUM CHLORIDE, CALCIUM CHLORIDE 600; 310; 30; 20 MG/100ML; MG/100ML; MG/100ML; MG/100ML
100 INJECTION, SOLUTION INTRAVENOUS CONTINUOUS
Status: DISCONTINUED | OUTPATIENT
Start: 2021-12-06 | End: 2021-12-06 | Stop reason: HOSPADM

## 2021-12-06 RX ORDER — CHLORHEXIDINE GLUCONATE 0.12 MG/ML
15 RINSE ORAL ONCE
Status: COMPLETED | OUTPATIENT
Start: 2021-12-06 | End: 2021-12-06

## 2021-12-06 RX ORDER — CLINDAMYCIN PHOSPHATE 900 MG/50ML
900 INJECTION INTRAVENOUS ONCE
Status: COMPLETED | OUTPATIENT
Start: 2021-12-06 | End: 2021-12-06

## 2021-12-06 RX ORDER — ACETAMINOPHEN 325 MG/1
650 TABLET ORAL EVERY 6 HOURS PRN
Status: DISCONTINUED | OUTPATIENT
Start: 2021-12-06 | End: 2021-12-06 | Stop reason: HOSPADM

## 2021-12-06 RX ORDER — HYDROMORPHONE HCL/PF 1 MG/ML
0.5 SYRINGE (ML) INJECTION
Status: DISCONTINUED | OUTPATIENT
Start: 2021-12-06 | End: 2021-12-06 | Stop reason: HOSPADM

## 2021-12-06 RX ORDER — MAGNESIUM HYDROXIDE 1200 MG/15ML
LIQUID ORAL AS NEEDED
Status: DISCONTINUED | OUTPATIENT
Start: 2021-12-06 | End: 2021-12-06 | Stop reason: HOSPADM

## 2021-12-06 RX ORDER — BUPIVACAINE HYDROCHLORIDE 2.5 MG/ML
INJECTION, SOLUTION EPIDURAL; INFILTRATION; INTRACAUDAL AS NEEDED
Status: DISCONTINUED | OUTPATIENT
Start: 2021-12-06 | End: 2021-12-06

## 2021-12-06 RX ORDER — MIDAZOLAM HYDROCHLORIDE 2 MG/2ML
INJECTION, SOLUTION INTRAMUSCULAR; INTRAVENOUS AS NEEDED
Status: DISCONTINUED | OUTPATIENT
Start: 2021-12-06 | End: 2021-12-06

## 2021-12-06 RX ORDER — HYDROCODONE BITARTRATE AND ACETAMINOPHEN 5; 325 MG/1; MG/1
1 TABLET ORAL EVERY 6 HOURS PRN
Qty: 20 TABLET | Refills: 0 | Status: SHIPPED | OUTPATIENT
Start: 2021-12-06

## 2021-12-06 RX ORDER — PROPOFOL 10 MG/ML
INJECTION, EMULSION INTRAVENOUS AS NEEDED
Status: DISCONTINUED | OUTPATIENT
Start: 2021-12-06 | End: 2021-12-06

## 2021-12-06 RX ORDER — OXYCODONE HYDROCHLORIDE AND ACETAMINOPHEN 5; 325 MG/1; MG/1
1 TABLET ORAL EVERY 4 HOURS PRN
Qty: 20 TABLET | Refills: 0 | Status: SHIPPED | OUTPATIENT
Start: 2021-12-06

## 2021-12-06 RX ADMIN — BUPIVACAINE HYDROCHLORIDE 10 ML: 2.5 INJECTION, SOLUTION EPIDURAL; INFILTRATION; INTRACAUDAL; PERINEURAL at 11:40

## 2021-12-06 RX ADMIN — FENTANYL CITRATE 50 MCG: 50 INJECTION INTRAMUSCULAR; INTRAVENOUS at 14:17

## 2021-12-06 RX ADMIN — ONDANSETRON 4 MG: 2 INJECTION INTRAMUSCULAR; INTRAVENOUS at 12:57

## 2021-12-06 RX ADMIN — FENTANYL CITRATE 50 MCG: 50 INJECTION, SOLUTION INTRAMUSCULAR; INTRAVENOUS at 12:22

## 2021-12-06 RX ADMIN — CLINDAMYCIN IN 5 PERCENT DEXTROSE 900 MG: 18 INJECTION, SOLUTION INTRAVENOUS at 11:59

## 2021-12-06 RX ADMIN — FENTANYL CITRATE 50 MCG: 50 INJECTION, SOLUTION INTRAMUSCULAR; INTRAVENOUS at 12:42

## 2021-12-06 RX ADMIN — FENTANYL CITRATE 50 MCG: 50 INJECTION, SOLUTION INTRAMUSCULAR; INTRAVENOUS at 11:30

## 2021-12-06 RX ADMIN — FENTANYL CITRATE 50 MCG: 50 INJECTION INTRAMUSCULAR; INTRAVENOUS at 14:32

## 2021-12-06 RX ADMIN — CHLORHEXIDINE GLUCONATE 0.12% ORAL RINSE 15 ML: 1.2 LIQUID ORAL at 10:43

## 2021-12-06 RX ADMIN — MIDAZOLAM 2 MG: 1 INJECTION INTRAMUSCULAR; INTRAVENOUS at 11:59

## 2021-12-06 RX ADMIN — FENTANYL CITRATE 50 MCG: 50 INJECTION, SOLUTION INTRAMUSCULAR; INTRAVENOUS at 13:57

## 2021-12-06 RX ADMIN — SODIUM CHLORIDE, SODIUM LACTATE, POTASSIUM CHLORIDE, AND CALCIUM CHLORIDE: .6; .31; .03; .02 INJECTION, SOLUTION INTRAVENOUS at 11:59

## 2021-12-06 RX ADMIN — DEXAMETHASONE SODIUM PHOSPHATE 4 MG: 10 INJECTION, SOLUTION INTRAMUSCULAR; INTRAVENOUS at 12:58

## 2021-12-06 RX ADMIN — PROPOFOL 200 MG: 10 INJECTION, EMULSION INTRAVENOUS at 11:59

## 2021-12-06 RX ADMIN — BUPIVACAINE HYDROCHLORIDE 10 ML: 2.5 INJECTION, SOLUTION EPIDURAL; INFILTRATION; INTRACAUDAL; PERINEURAL at 11:35

## 2022-08-07 ENCOUNTER — OFFICE VISIT (OUTPATIENT)
Dept: URGENT CARE | Facility: MEDICAL CENTER | Age: 42
End: 2022-08-07
Payer: COMMERCIAL

## 2022-08-07 VITALS
RESPIRATION RATE: 16 BRPM | SYSTOLIC BLOOD PRESSURE: 126 MMHG | DIASTOLIC BLOOD PRESSURE: 84 MMHG | HEART RATE: 87 BPM | TEMPERATURE: 98.1 F | OXYGEN SATURATION: 97 %

## 2022-08-07 DIAGNOSIS — M71.22 BAKER'S CYST OF KNEE, LEFT: Primary | ICD-10-CM

## 2022-08-07 PROCEDURE — 99213 OFFICE O/P EST LOW 20 MIN: CPT | Performed by: PHYSICIAN ASSISTANT

## 2022-08-07 RX ORDER — PREDNISONE 20 MG/1
20 TABLET ORAL 2 TIMES DAILY WITH MEALS
Qty: 10 TABLET | Refills: 0 | Status: SHIPPED | OUTPATIENT
Start: 2022-08-07 | End: 2022-08-12

## 2022-08-07 NOTE — PROGRESS NOTES
[employer] [place] [employer]        NAME: Terence Sigala is a 36 y o  female  : 1980    MRN: [id number]  DATE: 2022  TIME: 5:29 PM    Assessment and Plan   Baker's cyst of knee, left [M71 22]  1  Baker's cyst of knee, left  Ambulatory Referral to Orthopedic Surgery    predniSONE 20 mg tablet         Patient Instructions     1  Tylenol as needed for pain  2  Take prednisone 20mg  Twice daily x 5 days as needed for pain/swelling  3  Activities as tolerated until consult with Orthopedics  Chief Complaint     Chief Complaint   Patient presents with    Knee Pain     Left knee pain, popliteal, unable to bear weight  Denies fall, injury or trauma  Some swelling  Wearing knee brace  Taking tylenol, motrin and z pack for URI  History of Present Illness       Scout Lau is a 79-year-old female presents with pain and swelling on the posterior aspect of her left knee that started 1 day prior  Patient denies any fall, trauma or inciting event  She reports she noticed some swelling and discomfort in the posterior aspect of her left leg  Patient reports she had a recent upper respiratory infection and is currently on Zithromax from her family physician  Knee Pain         Review of Systems   Review of Systems   Constitutional: Negative  Musculoskeletal: Positive for gait problem and myalgias  Negative for joint swelling           Current Medications       Current Outpatient Medications:     cloNIDine (CATAPRES) 0 1 mg tablet, , Disp: , Rfl:     HYDROcodone-acetaminophen (Norco) 5-325 mg per tablet, Take 1 tablet by mouth every 6 (six) hours as needed for pain for up to 20 doses Max Daily Amount: 4 tablets, Disp: 20 tablet, Rfl: 0    Levothyroxine Sodium 50 MCG CAPS, Take 50 mcg by mouth daily  , Disp: , Rfl:     predniSONE 20 mg tablet, Take 1 tablet (20 mg total) by mouth 2 (two) times a day with meals for 5 days, Disp: 10 tablet, Rfl: 0    sertraline (ZOLOFT) 100 mg tablet, 100 mg daily  , Disp: , Rfl:     calcipotriene (DOVONEX) 0 005 % cream, Apply topically 2 (two) times a day (Patient not taking: No sig reported), Disp: 60 g, Rfl: 11    diphenhydrAMINE (BENADRYL) 25 mg tablet, Take 25 mg by mouth daily at bedtime as needed for itching (patient takes 4 Benadryl every night) (Patient not taking: Reported on 8/7/2022), Disp: , Rfl:     diphenhydrAMINE-APAP, sleep,  MG TABS, Take by mouth daily at bedtime Takes half tab nightly (Patient not taking: Reported on 8/7/2022), Disp: , Rfl:     famotidine (PEPCID) 20 mg tablet, Take 20 mg by mouth daily as needed for heartburn (Patient not taking: Reported on 8/7/2022), Disp: , Rfl:     levothyroxine 100 mcg tablet, Take 100 mcg by mouth daily (Patient not taking: Reported on 8/7/2022), Disp: , Rfl:     LORazepam (ATIVAN) 0 5 mg tablet, Take 0 5 mg by mouth 2 (two) times a day as needed (Patient not taking: Reported on 8/7/2022), Disp: , Rfl:     Melatonin 10 MG TABS, Take by mouth daily at bedtime as needed   (Patient not taking: Reported on 8/7/2022), Disp: , Rfl:     oxyCODONE-acetaminophen (Percocet) 5-325 mg per tablet, Take 1 tablet by mouth every 4 (four) hours as needed for moderate pain for up to 20 doses Max Daily Amount: 6 tablets (Patient not taking: Reported on 8/7/2022), Disp: 20 tablet, Rfl: 0    Current Allergies     Allergies as of 08/07/2022 - Reviewed 08/07/2022   Allergen Reaction Noted    Other Blisters 12/06/2021    Penicillins Hives 06/04/2013    Pollen extract  01/27/2017            The following portions of the patient's history were reviewed and updated as appropriate: allergies, current medications, past family history, past medical history, past social history, past surgical history and problem list      Past Medical History:   Diagnosis Date    Anxiety     Anxiety disorder     Diabetes mellitus ([facility] )     gestational insulin    Disease of thyroid gland     hypo    Elderly multigravida, currently pregnant Resolved 2017     Fatty liver     Hereditary disease in family possibly affecting fetus     Resolved 2017     Hypertension     occ    Insulin controlled gestational diabetes mellitus (GDM) in third trimester     Resolved 2017     Palpitations     PJRT (permanent junctional reciprocating tachycardia) ([facility] ) 2008    SVT (supraventricular tachycardia) ([facility] )     Varicella     childhood       Past Surgical History:   Procedure Laterality Date    ARTHROSCOPY ANKLE Left 2021    Procedure: ANKLE ARTHROSCOPY WITH EXTENSIVE DEBRIDEMENT AND MICROFRACTURE OF TALAR OSTEOCHONDRAL DEFECT;  Surgeon: Monica Faulkner DPM;  Location: [facility] MAIN OR;  Service: Podiatry    BREAST CYST EXCISION Left     age 24- benign    CARDIAC ELECTROPHYSIOLOGY STUDY AND ABLATION      GYNECOLOGIC CRYOSURGERY      INDUCED       MASS EXCISION Left 2021    Procedure: TIBIAL CYST CURRETAGE AND BONE VOID FILLER;  Surgeon: Monica Faulkner DPM;  Location: [facility] MAIN OR;  Service: Podiatry    [address] Left 2021    Procedure: APPLICATION CAST (APPLICATION BELOW KNEE SPLINT); Surgeon: Monica Faulkner DPM;  Location: [facility] MAIN OR;  Service: Podiatry       Family History   Problem Relation Age of Onset    Asthma Mother     Hypertension Mother     Hypothyroidism Mother     Hypothyroidism Sister     Hypothyroidism Maternal Aunt     Hypothyroidism Maternal Grandmother     Diabetes Maternal Grandfather     Mental illness Sister     Other Family         Tachycardia     No Known Problems Father     No Known Problems Daughter     No Known Problems Maternal Aunt     No Known Problems Maternal Aunt     Breast cancer Neg Hx          Medications have been verified  Objective   /84   Pulse 87   Temp 98 1 °F (36 7 °C)   Resp 16   SpO2 97%   No LMP recorded  Physical Exam     Physical Exam  Constitutional:       General: She is not in acute distress  Appearance: Normal appearance  She is not ill-appearing  Cardiovascular:      Rate and Rhythm: Normal rate and regular rhythm  Heart sounds: Normal heart sounds  No murmur heard  Pulmonary:      Effort: Pulmonary effort is normal       Breath sounds: Normal breath sounds  Musculoskeletal:      Right knee: No swelling or deformity  Tenderness present  No LCL laxity, MCL laxity, ACL laxity or PCL laxity  Normal meniscus and normal patellar mobility  Comments: There is tenderness to palpation in a palpable soft tissue mass on the posterior aspect of the left knee  No tenderness to palpation over the gastrox  Neurological:      Mental Status: She is alert

## 2022-08-07 NOTE — PATIENT INSTRUCTIONS
1  Tylenol as needed for pain  2  Take prednisone 20mg  Twice daily x 5 days as needed for pain/swelling  3  Activities as tolerated until consult with Orthopedics

## 2023-03-10 ENCOUNTER — APPOINTMENT (OUTPATIENT)
Dept: RADIOLOGY | Facility: MEDICAL CENTER | Age: 43
End: 2023-03-10

## 2023-03-10 DIAGNOSIS — M17.9 OSTEOARTHRITIS OF KNEE, UNSPECIFIED LATERALITY, UNSPECIFIED OSTEOARTHRITIS TYPE: ICD-10-CM

## 2023-05-18 ENCOUNTER — OFFICE VISIT (OUTPATIENT)
Dept: OBGYN CLINIC | Facility: MEDICAL CENTER | Age: 43
End: 2023-05-18

## 2023-05-18 VITALS
BODY MASS INDEX: 40.48 KG/M2 | DIASTOLIC BLOOD PRESSURE: 104 MMHG | HEART RATE: 101 BPM | HEIGHT: 62 IN | WEIGHT: 220 LBS | SYSTOLIC BLOOD PRESSURE: 150 MMHG

## 2023-05-18 DIAGNOSIS — M25.562 PATELLOFEMORAL ARTHRALGIA OF LEFT KNEE: Primary | ICD-10-CM

## 2023-05-18 RX ORDER — ROPIVACAINE HYDROCHLORIDE 5 MG/ML
10 INJECTION, SOLUTION EPIDURAL; INFILTRATION; PERINEURAL
Status: COMPLETED | OUTPATIENT
Start: 2023-05-18 | End: 2023-05-18

## 2023-05-18 RX ORDER — TRIAMCINOLONE ACETONIDE 40 MG/ML
80 INJECTION, SUSPENSION INTRA-ARTICULAR; INTRAMUSCULAR
Status: COMPLETED | OUTPATIENT
Start: 2023-05-18 | End: 2023-05-18

## 2023-05-18 RX ADMIN — ROPIVACAINE HYDROCHLORIDE 10 ML: 5 INJECTION, SOLUTION EPIDURAL; INFILTRATION; PERINEURAL at 09:58

## 2023-05-18 RX ADMIN — TRIAMCINOLONE ACETONIDE 80 MG: 40 INJECTION, SUSPENSION INTRA-ARTICULAR; INTRAMUSCULAR at 09:58

## 2023-05-18 NOTE — PROGRESS NOTES
1  Patellofemoral arthralgia of left knee  Ambulatory referral to Physical Therapy        Orders Placed This Encounter   Procedures   • Large joint arthrocentesis   • Ambulatory referral to Physical Therapy        Impression:  Left knee pain likely secondary to mild osteoarthritis/patellofemoral arthralgia  We discussed different treatment options and decided to proceed with an intra-articular steroid injection  The patient has a brace at home which she can use for the next week  She will start physical therapy  I will see her back in 4-5 weeks to reassess  Can consider viscosupplementation  Imaging Studies (I personally reviewed images in PACS and report):  Left knee x-rays most recent to this encounter reviewed  These images show no joint space narrowing with subchondral sclerosis  There is slight lateral tracking of the patella  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  Return for 4-5 week f/u  Patient is in agreement with the above plan  HPI:  Creighton Scheuermann is a 36 y o  female  who presents for evaluation of   Chief Complaint   Patient presents with   • Left Knee - Pain       Onset/Mechanism: Pain since August without an injury  Location: Posterior knee  Radiation: As above  Provocative: Activity  Severity: Painful  Associated Symptoms: As above  Treatment so far: No recent treatment      Following history reviewed and updated:  Past Medical History:   Diagnosis Date   • Anxiety    • Anxiety disorder    • Diabetes mellitus ([facility] )     gestational insulin   • Disease of thyroid gland     hypo   • Elderly multigravida, currently pregnant     Resolved 6/16/2017    • Fatty liver    • Hereditary disease in family possibly affecting fetus     Resolved 6/16/2017    • Hypertension     occ   • Insulin controlled gestational diabetes mellitus (GDM) in third trimester     Resolved 6/16/2017    • Palpitations    • PJRT (permanent junctional reciprocating "tachycardia) ([facility] ) 2008   • SVT (supraventricular tachycardia) ([facility] )    • Varicella     childhood     Past Surgical History:   Procedure Laterality Date   • ARTHROSCOPY ANKLE Left 2021    Procedure: ANKLE ARTHROSCOPY WITH EXTENSIVE DEBRIDEMENT AND MICROFRACTURE OF TALAR OSTEOCHONDRAL DEFECT;  Surgeon: Ford Alvarez DPM;  Location: [address] [place] [address];  Service: Podiatry   • BREAST CYST EXCISION Left     age 24- benign   • CARDIAC ELECTROPHYSIOLOGY STUDY AND ABLATION     • GYNECOLOGIC CRYOSURGERY     • INDUCED      • MASS EXCISION Left 2021    Procedure: TIBIAL CYST CURRETAGE AND BONE VOID FILLER;  Surgeon: Ford Alvarez DPM;  Location: [address] [place] [address];  Service: Podiatry   • [place] APPLICATION SHORT LEG SPLINT CALF FOOT Left 2021    Procedure: APPLICATION CAST (APPLICATION BELOW KNEE SPLINT); Surgeon: Ford Alvarez DPM;  Location: [address] [place] [address];  Service: Podiatry     Social History   Social History     Substance and Sexual Activity   Alcohol Use Not Currently    Comment: social     Social History     Substance and Sexual Activity   Drug Use No     Social History     Tobacco Use   Smoking Status Some Days   • Packs/day: 0 25   • Types: Cigarettes   Smokeless Tobacco Never     Family History   Problem Relation Age of Onset   • Asthma Mother    • Hypertension Mother    • Hypothyroidism Mother    • Hypothyroidism Sister    • Hypothyroidism Maternal Aunt    • Hypothyroidism Maternal Grandmother    • Diabetes Maternal Grandfather    • Mental illness Sister    • Other Family         Tachycardia    • No Known Problems Father    • No Known Problems Daughter    • No Known Problems Maternal Aunt    • No Known Problems Maternal Aunt    • Breast cancer Neg Hx      Allergies   Allergen Reactions   • Other Blisters     PAPER TAPE   • Penicillins Hives   • Pollen Extract         Constitutional:  BP (!) 150/104   Pulse 101   Ht 5' 2\" (1 575 m)   Wt 99 8 kg (220 lb)   BMI 40 24 kg/m²    General: NAD    Eyes: Anicteric " sclerae  Neck: Supple  Lungs: Unlabored breathing  Cardiovascular: No lower extremity edema  Skin: Intact without erythema  Neurologic: Sensation intact to light touch  Psychiatric: Mood and affect are appropriate  Left Knee Exam     Tenderness   The patient is experiencing tenderness in the medial joint line and medial retinaculum  Range of Motion   Extension: normal     Tests   Varus: negative Valgus: negative    Other   Erythema: absent  Scars: absent  Sensation: normal  Pulse: present  Swelling: none  Effusion: no effusion present             Large joint arthrocentesis: L knee  Universal Protocol:  Consent: Verbal consent obtained  Written consent not obtained  Risks and benefits: risks, benefits and alternatives were discussed  Consent given by: patient  Timeout called at: 5/18/2023 9:56 AM   Patient understanding: patient states understanding of the procedure being performed  Patient consent: the patient's understanding of the procedure matches consent given  Site marked: the operative site was marked  Radiology Images displayed and confirmed  If images not available, report reviewed: imaging studies available  Patient identity confirmed: verbally with patient    Supporting Documentation  Indications: pain   Procedure Details  Location: knee - L knee  Needle size: 22 G  Ultrasound guidance: no  Approach: Inferolateral to patella  Medications administered: 80 mg triamcinolone acetonide 40 mg/mL; 10 mL ropivacaine 0 5 %    Patient tolerance: patient tolerated the procedure well with no immediate complications  Dressing:  Sterile dressing applied    There was little to no resistance encountered during the injection  Risks of this procedure include:    - Risk of bleeding since a needle is involved  - Risk of infection (1/10,000 chance as per recent studies)    Signs/symptoms were discussed and they would prompt an urgent evaluation at an emergency department   - Risk of pigmentation or skin dimpling in the skin (2-3% chance as per recent studies) from the steroid  - Risk of increased pain from steroid flare (1% chance as per recent studies) that typically lasts 24-48 hours  - Risk of increased blood sugars from the steroid medication that can last for a few weeks  If the patient is a diabetic or pre-diabetic, they were encouraged to closely monitor their blood sugars and discuss with PCP if elevated more than usual or if having symptoms  The benefits outweigh the risks and so the procedure was completed

## 2023-05-18 NOTE — PATIENT INSTRUCTIONS
You had a cortisone injection today  Some people also refer to this as steroid or corticosteroid  There are two medicines in this injection, a numbing medicine (anesthetic) and a steroid  Most people get relief immediately but it can take up to two weeks  Rarely, some people can get a flushing reaction where they feel warm and flushed in the face  This is a side effect and resolves on its own  If you experience any drainage, redness or fevers, please give us a call or go to the nearest emergency room  You will be starting physical therapy  It is important to do home exercises as given by your physical therapist as you go through PT to speed up your recovery  Physical therapy addresses the problems that are causing your pain, instead of covering them up, as some other treatment options do  Rules for limiting activity by pain symptoms:      -You can work through some pain, but keep it at a level from which you are distractible  Pain should not exceed 3/10 in severity    -Do not change your biomechanics / form with your activity  This can lead to further injury    -If you pay for your activity later with substantial symptom exacerbation, you are not yet ready for that level of exertion

## (undated) DEVICE — POV-IOD SOLUTION 4OZ BT

## (undated) DEVICE — 3M™ IOBAN™ 2 ANTIMICROBIAL INCISE DRAPE 6640EZ: Brand: IOBAN™ 2

## (undated) DEVICE — SINGLE PORT MANIFOLD: Brand: NEPTUNE 2

## (undated) DEVICE — NEEDLE 25G X 1 1/2

## (undated) DEVICE — ACE WRAP 4 IN UNSTERILE

## (undated) DEVICE — BETHLEHEM UNIVERSAL  MIONR EXT: Brand: CARDINAL HEALTH

## (undated) DEVICE — STERILE POLYISOPRENE POWDER-FREE SURGICAL GLOVES WITH EMOLLIENT COATING: Brand: PROTEXIS

## (undated) DEVICE — COBAN 4 IN STERILE

## (undated) DEVICE — SUT ETHILON 3-0 PS-1 18 IN 1663G

## (undated) DEVICE — INTENDED FOR TISSUE SEPARATION, AND OTHER PROCEDURES THAT REQUIRE A SHARP SURGICAL BLADE TO PUNCTURE OR CUT.: Brand: BARD-PARKER SAFETY BLADES SIZE 15, STERILE

## (undated) DEVICE — SYRINGE 10ML LL

## (undated) DEVICE — CHLORAPREP HI-LITE 26ML ORANGE

## (undated) DEVICE — SUT VICRYL 3-0 SH 27 IN J416H

## (undated) DEVICE — CUFF TOURNIQUET 34 X 4 IN QUICK CONNECT DISP 1BLA

## (undated) DEVICE — INTENDED FOR TISSUE SEPARATION, AND OTHER PROCEDURES THAT REQUIRE A SHARP SURGICAL BLADE TO PUNCTURE OR CUT.: Brand: BARD-PARKER ® SAFETYLOCK CARBON RIB-BACK BLADES

## (undated) DEVICE — NEEDLE FILTER 5 MICR 19G X 1.5IN

## (undated) DEVICE — ABDOMINAL PAD: Brand: DERMACEA

## (undated) DEVICE — STOCKINETTE,IMPERVIOUS,12X48,STERILE: Brand: MEDLINE

## (undated) DEVICE — WEBRIL 6 IN UNSTERILE

## (undated) DEVICE — GUHL ANKLE DISTRACTOR FOOT STRAPS,                                    STERILE, LATEX FREE BOX OF 6

## (undated) DEVICE — STERILE POLYISOPRENE POWDER-FREE SURGICAL GLOVES: Brand: PROTEXIS

## (undated) DEVICE — BULB SYRINGE,IRRIGATION WITH PROTECTIVE CAP: Brand: DOVER

## (undated) DEVICE — NEEDLE SPINAL18G X 3.5 IN QUINCKE

## (undated) DEVICE — OCCLUSIVE GAUZE STRIP,3% BISMUTH TRIBROMOPHENATE IN PETROLATUM BLEND: Brand: XEROFORM

## (undated) DEVICE — KM172-29-62S K-WIRE .062 X 9"
Type: IMPLANTABLE DEVICE | Site: TIBIA | Status: NON-FUNCTIONAL
Brand: BRASSELER USA

## (undated) DEVICE — BLADE SHAVER DISSECTOR SJ 3MM 7CM COOLCUT

## (undated) DEVICE — ACE WRAP 6 IN UNSTERILE

## (undated) DEVICE — CURITY NON-ADHERENT STRIPS: Brand: CURITY

## (undated) DEVICE — PAD CAST 4 IN COTTON NON STERILE

## (undated) DEVICE — STRETCH BANDAGE: Brand: CURITY

## (undated) DEVICE — STANDARD SURGICAL GOWN, L: Brand: CONVERTORS

## (undated) DEVICE — TUBING ARTHROSCOPIC WAVE  MAIN PUMP

## (undated) DEVICE — SCD SEQUENTIAL COMPRESSION COMFORT SLEEVE MEDIUM KNEE LENGTH: Brand: KENDALL SCD

## (undated) DEVICE — PENCIL ELECTROSURG E-Z CLEAN -0035H

## (undated) DEVICE — GAUZE SPONGES,16 PLY: Brand: CURITY

## (undated) DEVICE — DRAPE C-ARM X-RAY